# Patient Record
Sex: MALE | Race: WHITE | NOT HISPANIC OR LATINO | URBAN - METROPOLITAN AREA
[De-identification: names, ages, dates, MRNs, and addresses within clinical notes are randomized per-mention and may not be internally consistent; named-entity substitution may affect disease eponyms.]

---

## 2024-03-27 NOTE — PROGRESS NOTES
"NEW PATIENT    SUBJECTIVE:     German Rabago  MRN:  [id number]  38 y.o. male    CHIEF COMPLAINT:    Neck pain    HPI:    German Rabago reports pain to left side of neck for the past 2 to 3 weeks.  Woke up one morning with severe pain, states "I think it may be a pinched nerve".  Denies h/o trauma or injury.  Was seen at [facility] in [place] on 3/25/24, dx with trapezius strain.  Tx with Toradol 60 mg IM, Baclofen 10 mg tid x 7 days and oral prednisone 40 mg daily x 5 days; meds have not helped at all.  Was advised to f/u with his PCP for anything further.  His pain has been located to base of neck shooting down into left shoulder and arm.  NT reported left hand/fingers.  Pain described as stabbing, worse as day progresses.  Current pain 6/10.  Tx with heating pad and stretching exercises.  Sleeping on right side, pain "becomes unbearable" when on his left side.      REVIEW OF SYSTEMS:  Review of Systems      ALLERGIES:  Review of patient's allergies indicates:  No Known Allergies    PROBLEM LIST:  There is no problem list on file for this patient.      CURRENT MEDICATION LIST:   NONE      Past medical, surgical, family and social histories have been reviewed today.      OBJECTIVE:     Vitals:    04/02/24 1330   BP: 120/78   Pulse: 88   Resp: 18   Temp: 97.7 °F (36.5 °C)   SpO2: 98%   Weight: 90.2 kg (198 lb 15.4 oz)   PainSc:   6   PainLoc: Neck       Physical Exam  Vitals reviewed.   Constitutional:       General: He is not in acute distress.  HENT:      Head: Normocephalic and atraumatic.   Musculoskeletal:      Cervical back: Spasms and tenderness present. No swelling, edema, deformity, rigidity, torticollis, bony tenderness or crepitus. Pain with movement present.      Thoracic back: Normal.      Lumbar back: Normal.        Back:    Skin:     Findings: No rash.   Neurological:      Mental Status: He is alert and oriented to person, place, and time.      Sensory: No sensory deficit.      Motor: No weakness.      " Coordination: Coordination normal.      Gait: Gait normal.   Psychiatric:         Mood and Affect: Mood normal.         Behavior: Behavior normal.         Thought Content: Thought content normal.         Judgment: Judgment normal.         ASSESSMENT:     1. Musculoskeletal neck pain  -     methylPREDNISolone acetate injection 80 mg  -     predniSONE (DELTASONE) 20 MG tablet; Take 3 tab by mouth x 5 days, 2 tab PO x 2 days, 1 tab PO x 2 days, then 1/2 tab on last day  Dispense: 22 tablet; Refill: 0  -     metaxalone (SKELAXIN) 800 MG tablet; Take 1 tablet (800 mg total) by mouth 3 (three) times daily as needed for Pain.  Dispense: 30 tablet; Refill: 0    2. Radiculopathy, unspecified spinal region  -     methylPREDNISolone acetate injection 80 mg  -     predniSONE (DELTASONE) 20 MG tablet; Take 3 tab by mouth x 5 days, 2 tab PO x 2 days, 1 tab PO x 2 days, then 1/2 tab on last day  Dispense: 22 tablet; Refill: 0  -     metaxalone (SKELAXIN) 800 MG tablet; Take 1 tablet (800 mg total) by mouth 3 (three) times daily as needed for Pain.  Dispense: 30 tablet; Refill: 0      PLAN:     Steroid IM today.  Medications as ordered, start prednisone in 2 to 3 days if shot wears off.  Supplement with Tylenol, Advil, pain patches or creams prn.  Back care and comfort measures discussed.  Contact office back if pain worsens or lingers.  RTC as directed and/or prn.        LISA Ramos  [facility]       25 minutes of total time spent on the encounter, which includes face to face time and non-face to face time preparing to see the patient.  This includes obtaining and/or reviewing separately obtained history, performing a medically appropriate examination and/or evaluation, and counseling and educating the patient/family/caregiver.  Includes documenting clinical information in the electronic or other health record, independently interpreting results (not separately reported) and communicating  results to the patient/family/caregiver, with care coordination (not separately reported).  Medications, tests and/or procedures ordered as necessary along with referring and communicating with other health professionals (when not separately reported).

## 2024-04-02 ENCOUNTER — OFFICE VISIT (OUTPATIENT)
Dept: FAMILY MEDICINE | Facility: CLINIC | Age: 39
End: 2024-04-02
Payer: COMMERCIAL

## 2024-04-02 VITALS
DIASTOLIC BLOOD PRESSURE: 78 MMHG | RESPIRATION RATE: 18 BRPM | WEIGHT: 198.94 LBS | SYSTOLIC BLOOD PRESSURE: 120 MMHG | OXYGEN SATURATION: 98 % | HEART RATE: 88 BPM | TEMPERATURE: 98 F

## 2024-04-02 DIAGNOSIS — M54.10 RADICULOPATHY, UNSPECIFIED SPINAL REGION: ICD-10-CM

## 2024-04-02 DIAGNOSIS — M54.2 MUSCULOSKELETAL NECK PAIN: Primary | ICD-10-CM

## 2024-04-02 PROCEDURE — 3078F DIAST BP <80 MM HG: CPT | Mod: CPTII,S$GLB,, | Performed by: REGISTERED NURSE

## 2024-04-02 PROCEDURE — 99202 OFFICE O/P NEW SF 15 MIN: CPT | Mod: 25,S$GLB,, | Performed by: REGISTERED NURSE

## 2024-04-02 PROCEDURE — 96372 THER/PROPH/DIAG INJ SC/IM: CPT | Mod: S$GLB,,, | Performed by: REGISTERED NURSE

## 2024-04-02 PROCEDURE — 99999 PR PBB SHADOW E&M-NEW PATIENT-LVL III: CPT | Mod: PBBFAC,,, | Performed by: REGISTERED NURSE

## 2024-04-02 PROCEDURE — 3074F SYST BP LT 130 MM HG: CPT | Mod: CPTII,S$GLB,, | Performed by: REGISTERED NURSE

## 2024-04-02 RX ORDER — METAXALONE 800 MG/1
800 TABLET ORAL 3 TIMES DAILY PRN
Qty: 30 TABLET | Refills: 0 | Status: SHIPPED | OUTPATIENT
Start: 2024-04-02 | End: 2024-04-29 | Stop reason: SDUPTHER

## 2024-04-02 RX ORDER — PREDNISONE 20 MG/1
TABLET ORAL
Qty: 22 TABLET | Refills: 0 | Status: SHIPPED | OUTPATIENT
Start: 2024-04-02 | End: 2024-04-29

## 2024-04-02 RX ORDER — METHYLPREDNISOLONE ACETATE 80 MG/ML
80 INJECTION, SUSPENSION INTRA-ARTICULAR; INTRALESIONAL; INTRAMUSCULAR; SOFT TISSUE ONCE
Status: COMPLETED | OUTPATIENT
Start: 2024-04-02 | End: 2024-04-02

## 2024-04-02 RX ADMIN — METHYLPREDNISOLONE ACETATE 80 MG: 80 INJECTION, SUSPENSION INTRA-ARTICULAR; INTRALESIONAL; INTRAMUSCULAR; SOFT TISSUE at 01:04

## 2024-04-03 ENCOUNTER — TELEPHONE (OUTPATIENT)
Dept: FAMILY MEDICINE | Facility: CLINIC | Age: 39
End: 2024-04-03
Payer: COMMERCIAL

## 2024-04-29 ENCOUNTER — OFFICE VISIT (OUTPATIENT)
Dept: FAMILY MEDICINE | Facility: CLINIC | Age: 39
End: 2024-04-29
Payer: COMMERCIAL

## 2024-04-29 ENCOUNTER — PATIENT MESSAGE (OUTPATIENT)
Dept: FAMILY MEDICINE | Facility: CLINIC | Age: 39
End: 2024-04-29

## 2024-04-29 VITALS
HEART RATE: 81 BPM | DIASTOLIC BLOOD PRESSURE: 74 MMHG | SYSTOLIC BLOOD PRESSURE: 122 MMHG | BODY MASS INDEX: 28.37 KG/M2 | TEMPERATURE: 98 F | WEIGHT: 202.63 LBS | HEIGHT: 71 IN | OXYGEN SATURATION: 98 %

## 2024-04-29 DIAGNOSIS — M54.10 RADICULOPATHY, UNSPECIFIED SPINAL REGION: ICD-10-CM

## 2024-04-29 DIAGNOSIS — M54.2 MUSCULOSKELETAL NECK PAIN: Primary | ICD-10-CM

## 2024-04-29 PROCEDURE — 3078F DIAST BP <80 MM HG: CPT | Mod: CPTII,S$GLB,, | Performed by: REGISTERED NURSE

## 2024-04-29 PROCEDURE — 3074F SYST BP LT 130 MM HG: CPT | Mod: CPTII,S$GLB,, | Performed by: REGISTERED NURSE

## 2024-04-29 PROCEDURE — 3008F BODY MASS INDEX DOCD: CPT | Mod: CPTII,S$GLB,, | Performed by: REGISTERED NURSE

## 2024-04-29 PROCEDURE — 99999 PR PBB SHADOW E&M-EST. PATIENT-LVL III: CPT | Mod: PBBFAC,,, | Performed by: REGISTERED NURSE

## 2024-04-29 PROCEDURE — 99213 OFFICE O/P EST LOW 20 MIN: CPT | Mod: S$GLB,,, | Performed by: REGISTERED NURSE

## 2024-04-29 RX ORDER — METAXALONE 800 MG/1
800 TABLET ORAL 3 TIMES DAILY PRN
Qty: 90 TABLET | Refills: 0 | Status: SHIPPED | OUTPATIENT
Start: 2024-04-29 | End: 2024-05-02 | Stop reason: ALTCHOICE

## 2024-04-29 RX ORDER — IBUPROFEN 800 MG/1
800 TABLET ORAL 3 TIMES DAILY PRN
Qty: 90 TABLET | Refills: 0 | Status: SHIPPED | OUTPATIENT
Start: 2024-04-29

## 2024-04-29 NOTE — PROGRESS NOTES
"SUBJECTIVE:     German Rabago  MRN:  [id number]  38 y.o. male    CHIEF COMPLAINT:     Neck/arm pain follow-up    HPI:    German Rabago returns with recurrent neck/arm issues.  Current pain 5/10.  Last seen in office on 4/2/24, felt better after visit w/ medical txmt as ordered and recommended.  However, issues have slowly/grad returned.  Pain to left side of neck radiating down left arm.  No new/recent trauma or injury.  Did get relief with Skelaxin and steroids as ordered.  Also tx with ice/heat and stretches.      Review of Systems   Constitutional: Negative.    HENT:  Negative for hearing loss.    Eyes: Negative.  Negative for discharge.   Respiratory: Negative.  Negative for wheezing.    Cardiovascular: Negative.  Negative for chest pain and palpitations.   Gastrointestinal:  Negative for blood in stool, constipation, diarrhea and vomiting.   Genitourinary:  Negative for hematuria and urgency.   Musculoskeletal:  Positive for neck pain. Negative for back pain and joint pain.   Neurological:  Positive for sensory change and weakness. Negative for headaches.   Endo/Heme/Allergies:  Negative for polydipsia.         Review of patient's allergies indicates:  No Known Allergies      There is no problem list on file for this patient.      Current Outpatient Medications   Medication Instructions    metaxalone (SKELAXIN) 800 mg, Oral, 3 times daily PRN         Past medical, surgical, family and social histories have been reviewed today.      OBJECTIVE:     Vitals:    04/29/24 1627   BP: 122/74   Pulse: 81   Temp: 98.3 °F (36.8 °C)   TempSrc: Tympanic   SpO2: 98%   Weight: 91.9 kg (202 lb 9.6 oz)   Height: 5' 11" (1.803 m)   PainSc:   5   PainLoc: Neck       Physical Exam  Vitals reviewed.   Constitutional:       General: He is not in acute distress.  HENT:      Head: Normocephalic and atraumatic.   Neck:     Musculoskeletal:         General: No swelling or deformity. Normal range of motion.      Cervical back: No edema, " erythema, rigidity, torticollis or crepitus. Pain with movement and muscular tenderness present. No spinous process tenderness. Normal range of motion.   Skin:     Capillary Refill: Capillary refill takes less than 2 seconds.   Neurological:      Mental Status: He is alert and oriented to person, place, and time.   Psychiatric:         Mood and Affect: Mood normal.         Behavior: Behavior normal.         Thought Content: Thought content normal.         Judgment: Judgment normal.         ASSESSMENT:     1. Musculoskeletal neck pain  -     ibuprofen (ADVIL,MOTRIN) 800 MG tablet; Take 1 tablet (800 mg total) by mouth 3 (three) times daily as needed for Pain.  Dispense: 90 tablet; Refill: 0  -     metaxalone (SKELAXIN) 800 MG tablet; Take 1 tablet (800 mg total) by mouth 3 (three) times daily as needed for Pain.  Dispense: 90 tablet; Refill: 0  -     Ambulatory referral/consult to [facility]; Future; Expected date: 05/06/2024    2. Radiculopathy, unspecified spinal region  -     ibuprofen (ADVIL,MOTRIN) 800 MG tablet; Take 1 tablet (800 mg total) by mouth 3 (three) times daily as needed for Pain.  Dispense: 90 tablet; Refill: 0  -     metaxalone (SKELAXIN) 800 MG tablet; Take 1 tablet (800 mg total) by mouth 3 (three) times daily as needed for Pain.  Dispense: 90 tablet; Refill: 0  -     Ambulatory referral/consult to [facility]; Future; Expected date: 05/06/2024      PLAN:     No further steroids for now.  Refilled Skelaxin, rx for Motrin as directed.  To Back/Spine specialist for further evaluation.  RTC as directed and/or prn.        LISA Ramos  [facility]       20 minutes of total time spent on the encounter, which includes face to face time and non-face to face time preparing to see the patient.  This includes obtaining and/or reviewing separately obtained history, performing a medically appropriate examination and/or evaluation, and counseling and  educating the patient/family/caregiver.  Includes documenting clinical information in the electronic or other health record, independently interpreting results (not separately reported) and communicating results to the patient/family/caregiver, with care coordination (not separately reported).  Medications, tests and/or procedures ordered as necessary along with referring and communicating with other health professionals (when not separately reported).

## 2024-04-30 ENCOUNTER — TELEPHONE (OUTPATIENT)
Dept: PAIN MEDICINE | Facility: CLINIC | Age: 39
End: 2024-04-30
Payer: COMMERCIAL

## 2024-05-02 DIAGNOSIS — M54.2 MUSCULOSKELETAL NECK PAIN: Primary | ICD-10-CM

## 2024-05-02 RX ORDER — ORPHENADRINE CITRATE 100 MG/1
100 TABLET, EXTENDED RELEASE ORAL 2 TIMES DAILY PRN
Qty: 60 TABLET | Refills: 0 | Status: SHIPPED | OUTPATIENT
Start: 2024-05-02

## 2024-05-29 PROBLEM — K57.30 DIVERTICULA OF COLON: Status: ACTIVE | Noted: 2024-05-29

## 2024-09-16 ENCOUNTER — PATIENT OUTREACH (OUTPATIENT)
Dept: ADMINISTRATIVE | Facility: HOSPITAL | Age: 39
End: 2024-09-16
Payer: COMMERCIAL

## 2025-01-13 ENCOUNTER — HOSPITAL ENCOUNTER (INPATIENT)
Facility: HOSPITAL | Age: 40
LOS: 3 days | Discharge: HOME | DRG: 392 | End: 2025-01-16
Attending: EMERGENCY MEDICINE | Admitting: HOSPITALIST
Payer: COMMERCIAL

## 2025-01-13 ENCOUNTER — OFFICE VISIT (OUTPATIENT)
Dept: FAMILY MEDICINE | Facility: CLINIC | Age: 40
End: 2025-01-13
Payer: COMMERCIAL

## 2025-01-13 VITALS
TEMPERATURE: 97 F | BODY MASS INDEX: 24.95 KG/M2 | SYSTOLIC BLOOD PRESSURE: 140 MMHG | HEART RATE: 101 BPM | WEIGHT: 178.25 LBS | DIASTOLIC BLOOD PRESSURE: 58 MMHG | OXYGEN SATURATION: 98 % | RESPIRATION RATE: 18 BRPM | HEIGHT: 71 IN

## 2025-01-13 DIAGNOSIS — R07.9 CHEST PAIN: ICD-10-CM

## 2025-01-13 DIAGNOSIS — K92.0 HEMATEMESIS WITH NAUSEA: ICD-10-CM

## 2025-01-13 DIAGNOSIS — R10.32 LEFT LOWER QUADRANT ABDOMINAL PAIN: ICD-10-CM

## 2025-01-13 DIAGNOSIS — K57.92 DIVERTICULITIS: Primary | ICD-10-CM

## 2025-01-13 DIAGNOSIS — K57.80 PERFORATED DIVERTICULUM: ICD-10-CM

## 2025-01-13 DIAGNOSIS — N17.9 AKI (ACUTE KIDNEY INJURY): ICD-10-CM

## 2025-01-13 DIAGNOSIS — K57.20 DIVERTICULITIS OF COLON WITH PERFORATION: Primary | ICD-10-CM

## 2025-01-13 DIAGNOSIS — R11.2 NAUSEA AND VOMITING, UNSPECIFIED VOMITING TYPE: ICD-10-CM

## 2025-01-13 PROBLEM — F41.1 GENERALIZED ANXIETY DISORDER: Status: ACTIVE | Noted: 2024-05-07

## 2025-01-13 PROBLEM — A41.9 SEPSIS: Status: ACTIVE | Noted: 2025-01-13

## 2025-01-13 LAB
ALBUMIN SERPL BCP-MCNC: 4.2 G/DL (ref 3.5–5.2)
ALP SERPL-CCNC: 84 U/L (ref 40–150)
ALT SERPL W/O P-5'-P-CCNC: 9 U/L (ref 10–44)
AMPHET+METHAMPHET UR QL: NEGATIVE
ANION GAP SERPL CALC-SCNC: 14 MMOL/L (ref 8–16)
AST SERPL-CCNC: 14 U/L (ref 10–40)
BACTERIA #/AREA URNS HPF: ABNORMAL /HPF
BARBITURATES UR QL SCN>200 NG/ML: NEGATIVE
BASOPHILS # BLD AUTO: 0.06 K/UL (ref 0–0.2)
BASOPHILS NFR BLD: 0.4 % (ref 0–1.9)
BENZODIAZ UR QL SCN>200 NG/ML: NEGATIVE
BILIRUB SERPL-MCNC: 0.7 MG/DL (ref 0.1–1)
BILIRUB UR QL STRIP: NEGATIVE
BUN SERPL-MCNC: 27 MG/DL (ref 6–20)
BZE UR QL SCN: NEGATIVE
CALCIUM SERPL-MCNC: 10.2 MG/DL (ref 8.7–10.5)
CANNABINOIDS UR QL SCN: ABNORMAL
CHLORIDE SERPL-SCNC: 101 MMOL/L (ref 95–110)
CLARITY UR: CLEAR
CO2 SERPL-SCNC: 23 MMOL/L (ref 23–29)
COLOR UR: YELLOW
CREAT SERPL-MCNC: 2.3 MG/DL (ref 0.5–1.4)
CREAT UR-MCNC: 192 MG/DL (ref 23–375)
DIFFERENTIAL METHOD BLD: ABNORMAL
EOSINOPHIL # BLD AUTO: 0.1 K/UL (ref 0–0.5)
EOSINOPHIL NFR BLD: 0.3 % (ref 0–8)
ERYTHROCYTE [DISTWIDTH] IN BLOOD BY AUTOMATED COUNT: 13.3 % (ref 11.5–14.5)
EST. GFR  (NO RACE VARIABLE): 36 ML/MIN/1.73 M^2
GLUCOSE SERPL-MCNC: 96 MG/DL (ref 70–110)
GLUCOSE UR QL STRIP: NEGATIVE
HCT VFR BLD AUTO: 48.5 % (ref 40–54)
HGB BLD-MCNC: 16.3 G/DL (ref 14–18)
HGB UR QL STRIP: ABNORMAL
HYALINE CASTS #/AREA URNS LPF: 3 /LPF
IMM GRANULOCYTES # BLD AUTO: 0.09 K/UL (ref 0–0.04)
IMM GRANULOCYTES NFR BLD AUTO: 0.6 % (ref 0–0.5)
KETONES UR QL STRIP: ABNORMAL
LACTATE SERPL-SCNC: 2.2 MMOL/L (ref 0.5–2.2)
LEUKOCYTE ESTERASE UR QL STRIP: ABNORMAL
LIPASE SERPL-CCNC: 67 U/L (ref 4–60)
LYMPHOCYTES # BLD AUTO: 2.8 K/UL (ref 1–4.8)
LYMPHOCYTES NFR BLD: 18.1 % (ref 18–48)
MCH RBC QN AUTO: 29.8 PG (ref 27–31)
MCHC RBC AUTO-ENTMCNC: 33.6 G/DL (ref 32–36)
MCV RBC AUTO: 89 FL (ref 82–98)
METHADONE UR QL SCN>300 NG/ML: NEGATIVE
MICROSCOPIC COMMENT: ABNORMAL
MONOCYTES # BLD AUTO: 1.5 K/UL (ref 0.3–1)
MONOCYTES NFR BLD: 9.4 % (ref 4–15)
NEUTROPHILS # BLD AUTO: 11.2 K/UL (ref 1.8–7.7)
NEUTROPHILS NFR BLD: 71.2 % (ref 38–73)
NITRITE UR QL STRIP: NEGATIVE
NRBC BLD-RTO: 0 /100 WBC
OPIATES UR QL SCN: ABNORMAL
PCP UR QL SCN>25 NG/ML: NEGATIVE
PH UR STRIP: 6 [PH] (ref 5–8)
PLATELET # BLD AUTO: 452 K/UL (ref 150–450)
PMV BLD AUTO: 10 FL (ref 9.2–12.9)
POCT GLUCOSE: 100 MG/DL (ref 70–110)
POTASSIUM SERPL-SCNC: 3.8 MMOL/L (ref 3.5–5.1)
PROT SERPL-MCNC: 8.6 G/DL (ref 6–8.4)
PROT UR QL STRIP: ABNORMAL
RBC # BLD AUTO: 5.47 M/UL (ref 4.6–6.2)
RBC #/AREA URNS HPF: 4 /HPF (ref 0–4)
SODIUM SERPL-SCNC: 138 MMOL/L (ref 136–145)
SP GR UR STRIP: 1.01 (ref 1–1.03)
TOXICOLOGY INFORMATION: ABNORMAL
URN SPEC COLLECT METH UR: ABNORMAL
UROBILINOGEN UR STRIP-ACNC: NEGATIVE EU/DL
WBC # BLD AUTO: 15.67 K/UL (ref 3.9–12.7)
WBC #/AREA URNS HPF: 9 /HPF (ref 0–5)

## 2025-01-13 PROCEDURE — 83605 ASSAY OF LACTIC ACID: CPT

## 2025-01-13 PROCEDURE — 99999 PR PBB SHADOW E&M-EST. PATIENT-LVL III: CPT | Mod: PBBFAC,,, | Performed by: REGISTERED NURSE

## 2025-01-13 PROCEDURE — 96365 THER/PROPH/DIAG IV INF INIT: CPT

## 2025-01-13 PROCEDURE — 81000 URINALYSIS NONAUTO W/SCOPE: CPT

## 2025-01-13 PROCEDURE — 99285 EMERGENCY DEPT VISIT HI MDM: CPT | Mod: 25

## 2025-01-13 PROCEDURE — 3077F SYST BP >= 140 MM HG: CPT | Mod: CPTII,S$GLB,, | Performed by: REGISTERED NURSE

## 2025-01-13 PROCEDURE — 63600175 PHARM REV CODE 636 W HCPCS

## 2025-01-13 PROCEDURE — 25000003 PHARM REV CODE 250

## 2025-01-13 PROCEDURE — 99222 1ST HOSP IP/OBS MODERATE 55: CPT | Mod: ,,, | Performed by: SURGERY

## 2025-01-13 PROCEDURE — 25000003 PHARM REV CODE 250: Performed by: EMERGENCY MEDICINE

## 2025-01-13 PROCEDURE — 63600175 PHARM REV CODE 636 W HCPCS: Performed by: EMERGENCY MEDICINE

## 2025-01-13 PROCEDURE — 11000001 HC ACUTE MED/SURG PRIVATE ROOM

## 2025-01-13 PROCEDURE — 25000003 PHARM REV CODE 250: Performed by: HOSPITALIST

## 2025-01-13 PROCEDURE — 87040 BLOOD CULTURE FOR BACTERIA: CPT | Mod: 59 | Performed by: HOSPITALIST

## 2025-01-13 PROCEDURE — 80053 COMPREHEN METABOLIC PANEL: CPT

## 2025-01-13 PROCEDURE — G2211 COMPLEX E/M VISIT ADD ON: HCPCS | Mod: S$GLB,,, | Performed by: REGISTERED NURSE

## 2025-01-13 PROCEDURE — 3008F BODY MASS INDEX DOCD: CPT | Mod: CPTII,S$GLB,, | Performed by: REGISTERED NURSE

## 2025-01-13 PROCEDURE — 63600175 PHARM REV CODE 636 W HCPCS: Performed by: HOSPITALIST

## 2025-01-13 PROCEDURE — 83690 ASSAY OF LIPASE: CPT

## 2025-01-13 PROCEDURE — 96361 HYDRATE IV INFUSION ADD-ON: CPT

## 2025-01-13 PROCEDURE — 3078F DIAST BP <80 MM HG: CPT | Mod: CPTII,S$GLB,, | Performed by: REGISTERED NURSE

## 2025-01-13 PROCEDURE — 80307 DRUG TEST PRSMV CHEM ANLYZR: CPT

## 2025-01-13 PROCEDURE — 99214 OFFICE O/P EST MOD 30 MIN: CPT | Mod: S$GLB,,, | Performed by: REGISTERED NURSE

## 2025-01-13 PROCEDURE — 96375 TX/PRO/DX INJ NEW DRUG ADDON: CPT

## 2025-01-13 PROCEDURE — 87040 BLOOD CULTURE FOR BACTERIA: CPT

## 2025-01-13 PROCEDURE — 85025 COMPLETE CBC W/AUTO DIFF WBC: CPT

## 2025-01-13 RX ORDER — NALOXONE HCL 0.4 MG/ML
0.02 VIAL (ML) INJECTION
Status: DISCONTINUED | OUTPATIENT
Start: 2025-01-13 | End: 2025-01-16 | Stop reason: HOSPADM

## 2025-01-13 RX ORDER — METRONIDAZOLE 500 MG/100ML
500 INJECTION, SOLUTION INTRAVENOUS EVERY 8 HOURS
Status: DISCONTINUED | OUTPATIENT
Start: 2025-01-13 | End: 2025-01-14

## 2025-01-13 RX ORDER — IBUPROFEN 200 MG
24 TABLET ORAL
Status: DISCONTINUED | OUTPATIENT
Start: 2025-01-13 | End: 2025-01-16 | Stop reason: HOSPADM

## 2025-01-13 RX ORDER — SODIUM CHLORIDE 9 MG/ML
1000 INJECTION, SOLUTION INTRAVENOUS
Status: COMPLETED | OUTPATIENT
Start: 2025-01-13 | End: 2025-01-13

## 2025-01-13 RX ORDER — MORPHINE SULFATE 4 MG/ML
2 INJECTION, SOLUTION INTRAMUSCULAR; INTRAVENOUS EVERY 6 HOURS PRN
Status: DISCONTINUED | OUTPATIENT
Start: 2025-01-13 | End: 2025-01-14

## 2025-01-13 RX ORDER — IBUPROFEN 200 MG
16 TABLET ORAL
Status: DISCONTINUED | OUTPATIENT
Start: 2025-01-13 | End: 2025-01-16 | Stop reason: HOSPADM

## 2025-01-13 RX ORDER — CIPROFLOXACIN 500 MG/1
500 TABLET ORAL
Status: ON HOLD | COMMUNITY
End: 2025-01-16 | Stop reason: HOSPADM

## 2025-01-13 RX ORDER — MORPHINE SULFATE 4 MG/ML
4 INJECTION, SOLUTION INTRAMUSCULAR; INTRAVENOUS
Status: COMPLETED | OUTPATIENT
Start: 2025-01-13 | End: 2025-01-13

## 2025-01-13 RX ORDER — SODIUM CHLORIDE 9 MG/ML
INJECTION, SOLUTION INTRAVENOUS CONTINUOUS
Status: DISCONTINUED | OUTPATIENT
Start: 2025-01-13 | End: 2025-01-16 | Stop reason: HOSPADM

## 2025-01-13 RX ORDER — MORPHINE SULFATE 4 MG/ML
4 INJECTION, SOLUTION INTRAMUSCULAR; INTRAVENOUS EVERY 6 HOURS PRN
Status: DISCONTINUED | OUTPATIENT
Start: 2025-01-13 | End: 2025-01-14

## 2025-01-13 RX ORDER — ACETAMINOPHEN 325 MG/1
650 TABLET ORAL EVERY 8 HOURS PRN
Status: DISCONTINUED | OUTPATIENT
Start: 2025-01-13 | End: 2025-01-16 | Stop reason: HOSPADM

## 2025-01-13 RX ORDER — TALC
6 POWDER (GRAM) TOPICAL NIGHTLY PRN
Status: DISCONTINUED | OUTPATIENT
Start: 2025-01-13 | End: 2025-01-16 | Stop reason: HOSPADM

## 2025-01-13 RX ORDER — GLUCAGON 1 MG
1 KIT INJECTION
Status: DISCONTINUED | OUTPATIENT
Start: 2025-01-13 | End: 2025-01-16 | Stop reason: HOSPADM

## 2025-01-13 RX ORDER — METRONIDAZOLE 500 MG/1
500 TABLET ORAL EVERY 12 HOURS
Status: ON HOLD | COMMUNITY
End: 2025-01-16 | Stop reason: HOSPADM

## 2025-01-13 RX ORDER — CEFTRIAXONE 2 G/1
2 INJECTION, POWDER, FOR SOLUTION INTRAMUSCULAR; INTRAVENOUS
Status: DISCONTINUED | OUTPATIENT
Start: 2025-01-13 | End: 2025-01-14

## 2025-01-13 RX ORDER — SODIUM CHLORIDE 0.9 % (FLUSH) 0.9 %
10 SYRINGE (ML) INJECTION EVERY 12 HOURS PRN
Status: DISCONTINUED | OUTPATIENT
Start: 2025-01-13 | End: 2025-01-16 | Stop reason: HOSPADM

## 2025-01-13 RX ORDER — ONDANSETRON HYDROCHLORIDE 2 MG/ML
4 INJECTION, SOLUTION INTRAVENOUS EVERY 6 HOURS PRN
Status: DISCONTINUED | OUTPATIENT
Start: 2025-01-13 | End: 2025-01-16 | Stop reason: HOSPADM

## 2025-01-13 RX ORDER — ONDANSETRON HYDROCHLORIDE 2 MG/ML
4 INJECTION, SOLUTION INTRAVENOUS
Status: COMPLETED | OUTPATIENT
Start: 2025-01-13 | End: 2025-01-13

## 2025-01-13 RX ADMIN — MORPHINE SULFATE 4 MG: 4 INJECTION INTRAVENOUS at 01:01

## 2025-01-13 RX ADMIN — MORPHINE SULFATE 4 MG: 4 INJECTION INTRAVENOUS at 08:01

## 2025-01-13 RX ADMIN — SODIUM CHLORIDE: 9 INJECTION, SOLUTION INTRAVENOUS at 08:01

## 2025-01-13 RX ADMIN — ONDANSETRON 4 MG: 2 INJECTION INTRAMUSCULAR; INTRAVENOUS at 09:01

## 2025-01-13 RX ADMIN — METRONIDAZOLE 500 MG: 500 INJECTION, SOLUTION INTRAVENOUS at 08:01

## 2025-01-13 RX ADMIN — SODIUM CHLORIDE 1000 ML: 9 INJECTION, SOLUTION INTRAVENOUS at 03:01

## 2025-01-13 RX ADMIN — PIPERACILLIN SODIUM AND TAZOBACTAM SODIUM 4.5 G: 4; .5 INJECTION, POWDER, FOR SOLUTION INTRAVENOUS at 04:01

## 2025-01-13 RX ADMIN — CEFTRIAXONE 2 G: 2 INJECTION, POWDER, FOR SOLUTION INTRAMUSCULAR; INTRAVENOUS at 08:01

## 2025-01-13 RX ADMIN — ONDANSETRON 4 MG: 2 INJECTION INTRAMUSCULAR; INTRAVENOUS at 01:01

## 2025-01-13 NOTE — PROGRESS NOTES
"German Rabago  MRN:  [id number]  39 y.o. male      SUBJECTIVE:     CHIEF COMPLAINT:      - Diverticulitis    HPI:  Max was seen last week at Urgent Care for abdominal pain. He was diagnosed with acute exacerbation of diverticular disease and constipation. He was treated with Cipro, Flagyl and Lactulose. He reports feeling worse, no BM since last week. Reports episode of vomiting up brown fluid with increased abdominal pain and nausea. Pain located to the right side of his abdomen, which is his usual area of pain when diverticulitis flares up.    He has been previously seen by GI (Dr. Sanchez).    History of perforated diverticulum.      Review of Systems   Constitutional:  Positive for chills, diaphoresis, fever and malaise/fatigue.        With temp of 99 last week   HENT: Negative.     Respiratory: Negative.     Cardiovascular: Negative.  Negative for chest pain and palpitations.   Gastrointestinal:  Positive for abdominal pain, constipation, nausea and vomiting. Negative for blood in stool, diarrhea, heartburn and melena.        + hematemesis   Genitourinary: Negative.  Negative for hematuria and urgency.   Musculoskeletal: Negative.    Neurological:  Positive for weakness. Negative for dizziness, loss of consciousness and headaches.       Review of patient's allergies indicates:  No Known Allergies      Patient Active Problem List   Diagnosis    Diverticula of colon    Generalized anxiety disorder       Current Outpatient Medications   Medication Instructions    ibuprofen (ADVIL,MOTRIN) 800 mg, Oral, 3 times daily PRN    orphenadrine (NORFLEX) 100 mg, Oral, 2 times daily PRN         Past medical, surgical, family and social histories have been reviewed today.      OBJECTIVE:     Vitals:    01/13/25 1105   BP: (!) 140/58   Pulse: 101   Resp: 18   Temp: 97.1 °F (36.2 °C)   TempSrc: Tympanic   SpO2: 98%   Weight: 80.8 kg (178 lb 3.9 oz)   Height: 5' 11" (1.803 m)     Vitals:    01/13/25 1105   PainSc: 10-Worst pain " "ever   PainLoc: Abdomen       Physical Exam  Vitals reviewed.   Constitutional:       Appearance: He is ill-appearing. He is not diaphoretic.   Abdominal:      General: Bowel sounds are increased. There is distension (mild).      Palpations: Abdomen is soft.      Tenderness: There is abdominal tenderness in the right upper quadrant and right lower quadrant. There is guarding. There is no right CVA tenderness, left CVA tenderness or rebound.      Comments: BS increased to RT side of abdomen, normal LT side. He localizes pain to "my usual area of diverticulitis".   Neurological:      Mental Status: He is alert and oriented to person, place, and time.   Psychiatric:         Mood and Affect: Mood normal.         Behavior: Behavior normal.         Thought Content: Thought content normal.         Judgment: Judgment normal.         ASSESSMENT:     1. Diverticulitis ---- treated by  recently with Cipro and Flagyl, worsening since that visit    2. Hematemesis with nausea    3. Perforated diverticulum ----- h/o      PLAN:     To ER now.  RTC as directed and/or prn.        LISA Ramos  [facility]       30 minutes of total time spent on the encounter, which includes face to face time and non-face to face time preparing to see the patient.  This includes obtaining and/or reviewing separately obtained history, performing a medically appropriate examination and/or evaluation, and counseling and educating the patient/family/caregiver.  Includes documenting clinical information in the electronic or other health record, independently interpreting results (not separately reported) and communicating results to the patient/family/caregiver, with care coordination (not separately reported).  Medications, tests and/or procedures ordered as necessary along with referring and communicating with other health professionals (when not separately reported).    "

## 2025-01-13 NOTE — Clinical Note
Diagnosis: Diverticulitis of colon with perforation [391164]   Future Attending Provider: STEFAN GANDARA [[id number]]   Requested Bed Type: Standard [1]   Reason for IP Medical Treatment  (Clinical interventions that can only be accomplished in the IP setting? ) :: IV Antibioitcs   Reason for IP Medical Treatment  (Clinical interventions that can only be accomplished in the IP setting? ) :: Surgery evaluation   Plans for Post-Acute care--if anticipated (pick the single best option):: A. No post acute care anticipated at this time   Special Needs:: No Special Needs [1]

## 2025-01-13 NOTE — ED PROVIDER NOTES
Encounter Date: 2025       History     Chief Complaint   Patient presents with    Abdominal Pain     Pt states he is having problems with his diverticulitis for the past 3 days. Pt states he has been having nausea, vomiting, diarrhea and abd pain.       39-year-old male with a previous medical history of diverticulitis presents to the emergency department with left lower quadrant abdominal pain, nausea, vomiting that began on Thursday.  Reports he was seen at urgent care and prescribed Cipro and Flagyl.  Reports he is taking the medication as prescribed, reports the abdominal pain worsened over the course of the past 2 days.  Was seen by his PCP and sent here for rule out of perforation.  He denies any chest pain, shortness for breath, dizziness, lightheadedness, fever, chills, or any other symptoms.        Review of patient's allergies indicates:  No Known Allergies  Past Medical History:   Diagnosis Date    Diverticular disease of large intestine without perforation or abscess      Past Surgical History:   Procedure Laterality Date    SMALL INTESTINE SURGERY  Can't remember    Long time ago at [facility] Long thought I had a hernia but it was a fatty deposit on small intestine     Family History   Problem Relation Name Age of Onset    Depression Mother Maryam Rabago      Social History     Tobacco Use    Smoking status: Some Days     Current packs/day: 0.00     Average packs/day: 0.5 packs/day for 15.0 years (7.5 ttl pk-yrs)     Types: Cigarettes     Last attempt to quit: 1/15/2019     Years since quittin.0    Smokeless tobacco: Never    Tobacco comments:     Since 17   Substance Use Topics    Alcohol use: Never    Drug use: Not Currently     Frequency: 7.0 times per week     Types: Marijuana     Comment: For mental health use     Review of Systems   Constitutional:  Negative for fever.   HENT:  Negative for sore throat.    Respiratory:  Negative for shortness of breath.    Cardiovascular:  Negative for chest  pain.   Gastrointestinal:  Positive for abdominal pain, nausea and vomiting.   Genitourinary:  Negative for dysuria.   Musculoskeletal:  Negative for back pain.   Skin:  Negative for rash.   Neurological:  Negative for dizziness, weakness and light-headedness.   Hematological:  Does not bruise/bleed easily.       Physical Exam     Initial Vitals [01/13/25 1220]   BP Pulse Resp Temp SpO2   (!) 136/94 77 16 98.6 °F (37 °C) 99 %      MAP       --         Physical Exam    Nursing note and vitals reviewed.  Constitutional: He appears well-developed and well-nourished.   HENT:   Head: Normocephalic and atraumatic.   Right Ear: Hearing normal.   Left Ear: Hearing normal.   Nose: Nose normal. Mouth/Throat: Uvula is midline, oropharynx is clear and moist and mucous membranes are normal.   Eyes: Conjunctivae, EOM and lids are normal. Pupils are equal, round, and reactive to light.   Neck: Trachea normal. Neck supple.   Normal range of motion.  Cardiovascular:  Normal rate, regular rhythm, normal heart sounds, intact distal pulses and normal pulses.           Pulmonary/Chest: Effort normal and breath sounds normal.   Abdominal: Bowel sounds are normal. He exhibits distension. There is abdominal tenderness in the right lower quadrant and left lower quadrant. There is rigidity, rebound and guarding.   Musculoskeletal:         General: Normal range of motion.      Cervical back: Normal, normal range of motion and neck supple.     Neurological: He is alert and oriented to person, place, and time. He has normal strength and normal reflexes. No cranial nerve deficit or sensory deficit. GCS eye subscore is 4. GCS verbal subscore is 5. GCS motor subscore is 6.   Skin: Skin is warm and dry.   Psychiatric: He has a normal mood and affect. His behavior is normal. Thought content normal.         ED Course   Procedures  Labs Reviewed   CBC W/ AUTO DIFFERENTIAL - Abnormal       Result Value    WBC 15.67 (*)     RBC 5.47      Hemoglobin 16.3       Hematocrit 48.5      MCV 89      MCH 29.8      MCHC 33.6      RDW 13.3      Platelets 452 (*)     MPV 10.0      Immature Granulocytes 0.6 (*)     Gran # (ANC) 11.2 (*)     Immature Grans (Abs) 0.09 (*)     Lymph # 2.8      Mono # 1.5 (*)     Eos # 0.1      Baso # 0.06      nRBC 0      Gran % 71.2      Lymph % 18.1      Mono % 9.4      Eosinophil % 0.3      Basophil % 0.4      Differential Method Automated     COMPREHENSIVE METABOLIC PANEL - Abnormal    Sodium 138      Potassium 3.8      Chloride 101      CO2 23      Glucose 96      BUN 27 (*)     Creatinine 2.3 (*)     Calcium 10.2      Total Protein 8.6 (*)     Albumin 4.2      Total Bilirubin 0.7      Alkaline Phosphatase 84      AST 14      ALT 9 (*)     eGFR 36 (*)     Anion Gap 14     LIPASE - Abnormal    Lipase 67 (*)    URINALYSIS, REFLEX TO URINE CULTURE - Abnormal    Specimen UA Urine, Clean Catch      Color, UA Yellow      Appearance, UA Clear      pH, UA 6.0      Specific Gravity, UA 1.015      Protein, UA 1+ (*)     Glucose, UA Negative      Ketones, UA 2+ (*)     Bilirubin (UA) Negative      Occult Blood UA 1+ (*)     Nitrite, UA Negative      Urobilinogen, UA Negative      Leukocytes, UA Trace (*)     Narrative:     Specimen Source->Urine   URINALYSIS MICROSCOPIC - Abnormal    RBC, UA 4      WBC, UA 9 (*)     Bacteria Rare      Hyaline Casts, UA 3 (*)     Microscopic Comment SEE COMMENT      Narrative:     Specimen Source->Urine   DRUG SCREEN PANEL, URINE EMERGENCY - Abnormal    Benzodiazepines Negative      Methadone metabolites Negative      Cocaine (Metab.) Negative      Opiate Scrn, Ur Presumptive Positive (*)     Barbiturate Screen, Ur Negative      Amphetamine Screen, Ur Negative      THC Presumptive Positive (*)     Phencyclidine Negative      Creatinine, Urine 192.0      Toxicology Information SEE COMMENT      Narrative:     Specimen Source->Urine   COMPREHENSIVE METABOLIC PANEL - Abnormal    Sodium 139      Potassium 3.7      Chloride 108       CO2 18 (*)     Glucose 87      BUN 25 (*)     Creatinine 1.6 (*)     Calcium 8.8      Total Protein 6.7      Albumin 3.3 (*)     Total Bilirubin 0.5      Alkaline Phosphatase 66      AST 9 (*)     ALT 8 (*)     eGFR 56 (*)     Anion Gap 13     CBC W/ AUTO DIFFERENTIAL - Abnormal    WBC 10.84      RBC 4.56 (*)     Hemoglobin 13.9 (*)     Hematocrit 41.3      MCV 91      MCH 30.5      MCHC 33.7      RDW 13.5      Platelets 382      MPV 9.7      Immature Granulocytes 0.6 (*)     Gran # (ANC) 7.0      Immature Grans (Abs) 0.06 (*)     Lymph # 2.7      Mono # 1.0      Eos # 0.1      Baso # 0.06      nRBC 0      Gran % 64.1      Lymph % 24.8      Mono % 9.1      Eosinophil % 0.8      Basophil % 0.6      Differential Method Automated     CULTURE, BLOOD    Blood Culture, Routine No Growth to date     CULTURE, BLOOD    Blood Culture, Routine No Growth to date     CULTURE, BLOOD    Blood Culture, Routine No Growth to date     CULTURE, BLOOD    Blood Culture, Routine No Growth to date     LACTIC ACID, PLASMA    Lactate (Lactic Acid) 2.2     DRUG SCREEN PANEL, URINE EMERGENCY   LACTIC ACID, PLASMA    Lactate (Lactic Acid) 0.6     MAGNESIUM    Magnesium 2.2     PHOSPHORUS    Phosphorus 3.4     POCT GLUCOSE    POCT Glucose 100     POCT GLUCOSE MONITORING CONTINUOUS          Imaging Results              CT Abdomen Pelvis  Without Contrast (Final result)  Result time 01/13/25 15:44:40      Final result by Rayomn Law MD (01/13/25 15:44:40)                   Impression:      Findings probably representing focally perforated diverticulitis on a background of acute on chronic inflammatory small bowel disease.  Alternatively the inflamed small bowel may be totally secondary to the perforated colonic diverticulum.  Fat within the wall of the distal small bowel suggests a history of infectious and/or inflammatory distal ileitis.  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  No drainable fluid collection.  Colonoscopy  is recommended when clinically appropriate to rule out underlying colonic mass.  Consider surgical consultation to aid in management.    Findings communicated to MARSHALL Rivero by epic chat on January 13, 2025 at 15:43.      Electronically signed by: Raymon Mauricio  Date:    01/13/2025  Time:    15:44               Narrative:    EXAMINATION:  CT ABDOMEN PELVIS WITHOUT CONTRAST    CLINICAL HISTORY:  LLQ abdominal pain.  History diverticulitis, BRENDON, unable to use IV contrast;    COMPARISON:  None available.    TECHNIQUE:  Axial CT images were obtained of the abdomen and pelvis.  Iterative reconstruction technique was used. The CT exam was performed using one or more of the following dose reduction techniques- Automated exposure control, adjustment of the mA and/or kV according to patient size, and/or use of iterative reconstructed technique.    FINDINGS:  Heart: Normal in size. No pericardial effusion.    Lung Bases: Mild bibasilar atelectasis/scarring.    Liver: Unremarkable.    Gallbladder: No calcified gallstones.    Bile Ducts: No evidence of dilated ducts.    Pancreas: No mass or peripancreatic fat stranding.    Spleen: Unremarkable.    Adrenals: Unremarkable.    Kidneys/ Ureters: Unremarkable.    Bladder: No evidence of wall thickening.    Reproductive organs: Unremarkable.    GI Tract/Mesentery: Multiple colonic diverticula.  In the pelvis between the sigmoid colon and multiple inflamed loops of small bowel there are numerous locules of extraluminal gas.  Mild associated free fluid.  No drainable fluid collection.  No evidence of bowel obstruction.  There is fatty infiltration throughout the wall of the distal ileum.  The appendix has a normal appearance.    Peritoneal Space: As above.    Retroperitoneum: No adenopathy.    Abdominal wall: Unremarkable.    Vasculature: Mild atherosclerotic disease. No aortic aneurysm.    Bones: No acute fracture.                                       Medications   sodium chloride  0.9% flush 10 mL (has no administration in time range)   naloxone 0.4 mg/mL injection 0.02 mg (has no administration in time range)   glucose chewable tablet 16 g (has no administration in time range)   glucose chewable tablet 24 g (has no administration in time range)   dextrose 50% injection 12.5 g (has no administration in time range)   dextrose 50% injection 25 g (has no administration in time range)   glucagon (human recombinant) injection 1 mg (has no administration in time range)   ondansetron injection 4 mg (4 mg Intravenous Given 1/13/25 2100)   melatonin tablet 6 mg (has no administration in time range)   acetaminophen tablet 650 mg (has no administration in time range)   morphine injection 4 mg (4 mg Intravenous Given 1/13/25 2045)   morphine injection 2 mg (has no administration in time range)   0.9% NaCl infusion ( Intravenous New Bag 1/13/25 2001)   cefTRIAXone injection 2 g (2 g Intravenous Given 1/13/25 2055)   metronidazole IVPB 500 mg (0 mg Intravenous Stopped 1/14/25 0745)   traZODone tablet 50 mg (has no administration in time range)   potassium chloride SA CR tablet 30 mEq (has no administration in time range)   morphine injection 4 mg (4 mg Intravenous Given 1/13/25 1350)   ondansetron injection 4 mg (4 mg Intravenous Given 1/13/25 1349)   0.9% NaCl infusion (0 mLs Intravenous Stopped 1/13/25 1644)   piperacillin-tazobactam (ZOSYN) 4.5 g in D5W 100 mL IVPB (MB+) (0 g Intravenous Stopped 1/13/25 1716)     Medical Decision Making  4:16 PM  Discussed patient case with Dr. Hampton, ED attending, agrees with treatment plan and current management of the patient.  Agrees with general surgery consult.    4:31 PM  Dr. Rivas, on-call general surgery, we will come see the patient in the emergency department.    6:49 PM  Dr. Rivas, on-call general surgery, evaluated the patient at bedside, states to admit to hospital medicine. Discussed patient's case with Yola De La Garza NP, Hospital Medicine, agrees with  treatment plan and management of the patient.  Agrees to accept patient under the care of Dr. Edgar at this time.  Admitting Service: Hospital Medicine  Admitting Physician: Dr. Edgar  Admit to: Inpatient     Amount and/or Complexity of Data Reviewed  Labs: ordered.  Radiology: ordered.    Risk  Prescription drug management.  Decision regarding hospitalization.                                      Clinical Impression:  Final diagnoses:  [K57.20] Diverticulitis of colon with perforation (Primary)  [R10.32] Left lower quadrant abdominal pain  [R11.2] Nausea and vomiting, unspecified vomiting type          ED Disposition Condition    Admit Stable                Robbin Rivero, MARSHALL  01/14/25 1114

## 2025-01-13 NOTE — FIRST PROVIDER EVALUATION
"Medical screening examination initiated.  I have conducted a focused provider triage encounter, findings are as follows:    Brief history of present illness:  39-year-old male with a history of diverticulitis presents to the emergency department chief complaint of abdominal pain.  Reports he was seen at urgent care on Thursday and prescribed Cipro and Flagyl for diverticulitis flare-up.  Reports the pain has been worsening.  Reports associated nausea and vomiting.  Denies any fever, chills or any other symptoms.    Vitals:    01/13/25 1220   BP: (!) 136/94   BP Location: Right arm   Pulse: 77   Resp: 16   Temp: 98.6 °F (37 °C)   TempSrc: Oral   SpO2: 99%   Weight: 80 kg (176 lb 5.9 oz)   Height: 5' 11" (1.803 m)       Pertinent physical exam:  Abdominal pain, history of diverticulitis    Brief workup plan:  Workup, CT    Preliminary workup initiated; this workup will be continued and followed by the physician or advanced practice provider that is assigned to the patient when roomed.  "

## 2025-01-14 LAB
ALBUMIN SERPL BCP-MCNC: 3.3 G/DL (ref 3.5–5.2)
ALP SERPL-CCNC: 66 U/L (ref 40–150)
ALT SERPL W/O P-5'-P-CCNC: 8 U/L (ref 10–44)
ANION GAP SERPL CALC-SCNC: 13 MMOL/L (ref 8–16)
AST SERPL-CCNC: 9 U/L (ref 10–40)
BASOPHILS # BLD AUTO: 0.06 K/UL (ref 0–0.2)
BASOPHILS NFR BLD: 0.6 % (ref 0–1.9)
BILIRUB SERPL-MCNC: 0.5 MG/DL (ref 0.1–1)
BUN SERPL-MCNC: 25 MG/DL (ref 6–20)
CALCIUM SERPL-MCNC: 8.8 MG/DL (ref 8.7–10.5)
CHLORIDE SERPL-SCNC: 108 MMOL/L (ref 95–110)
CO2 SERPL-SCNC: 18 MMOL/L (ref 23–29)
CREAT SERPL-MCNC: 1.6 MG/DL (ref 0.5–1.4)
DIFFERENTIAL METHOD BLD: ABNORMAL
EOSINOPHIL # BLD AUTO: 0.1 K/UL (ref 0–0.5)
EOSINOPHIL NFR BLD: 0.8 % (ref 0–8)
ERYTHROCYTE [DISTWIDTH] IN BLOOD BY AUTOMATED COUNT: 13.5 % (ref 11.5–14.5)
EST. GFR  (NO RACE VARIABLE): 56 ML/MIN/1.73 M^2
GLUCOSE SERPL-MCNC: 87 MG/DL (ref 70–110)
HCT VFR BLD AUTO: 41.3 % (ref 40–54)
HGB BLD-MCNC: 13.9 G/DL (ref 14–18)
IMM GRANULOCYTES # BLD AUTO: 0.06 K/UL (ref 0–0.04)
IMM GRANULOCYTES NFR BLD AUTO: 0.6 % (ref 0–0.5)
LACTATE SERPL-SCNC: 0.6 MMOL/L (ref 0.5–2.2)
LYMPHOCYTES # BLD AUTO: 2.7 K/UL (ref 1–4.8)
LYMPHOCYTES NFR BLD: 24.8 % (ref 18–48)
MAGNESIUM SERPL-MCNC: 2.2 MG/DL (ref 1.6–2.6)
MCH RBC QN AUTO: 30.5 PG (ref 27–31)
MCHC RBC AUTO-ENTMCNC: 33.7 G/DL (ref 32–36)
MCV RBC AUTO: 91 FL (ref 82–98)
MONOCYTES # BLD AUTO: 1 K/UL (ref 0.3–1)
MONOCYTES NFR BLD: 9.1 % (ref 4–15)
NEUTROPHILS # BLD AUTO: 7 K/UL (ref 1.8–7.7)
NEUTROPHILS NFR BLD: 64.1 % (ref 38–73)
NRBC BLD-RTO: 0 /100 WBC
PHOSPHATE SERPL-MCNC: 3.4 MG/DL (ref 2.7–4.5)
PLATELET # BLD AUTO: 382 K/UL (ref 150–450)
PMV BLD AUTO: 9.7 FL (ref 9.2–12.9)
POTASSIUM SERPL-SCNC: 3.7 MMOL/L (ref 3.5–5.1)
PROT SERPL-MCNC: 6.7 G/DL (ref 6–8.4)
RBC # BLD AUTO: 4.56 M/UL (ref 4.6–6.2)
SODIUM SERPL-SCNC: 139 MMOL/L (ref 136–145)
WBC # BLD AUTO: 10.84 K/UL (ref 3.9–12.7)

## 2025-01-14 PROCEDURE — 63600175 PHARM REV CODE 636 W HCPCS: Performed by: HOSPITALIST

## 2025-01-14 PROCEDURE — 63600175 PHARM REV CODE 636 W HCPCS: Performed by: STUDENT IN AN ORGANIZED HEALTH CARE EDUCATION/TRAINING PROGRAM

## 2025-01-14 PROCEDURE — 83735 ASSAY OF MAGNESIUM: CPT | Performed by: HOSPITALIST

## 2025-01-14 PROCEDURE — 63600175 PHARM REV CODE 636 W HCPCS

## 2025-01-14 PROCEDURE — 21400001 HC TELEMETRY ROOM

## 2025-01-14 PROCEDURE — 80053 COMPREHEN METABOLIC PANEL: CPT | Performed by: HOSPITALIST

## 2025-01-14 PROCEDURE — 84100 ASSAY OF PHOSPHORUS: CPT | Performed by: HOSPITALIST

## 2025-01-14 PROCEDURE — 83605 ASSAY OF LACTIC ACID: CPT | Performed by: HOSPITALIST

## 2025-01-14 PROCEDURE — 25000003 PHARM REV CODE 250

## 2025-01-14 PROCEDURE — 99232 SBSQ HOSP IP/OBS MODERATE 35: CPT | Mod: ,,, | Performed by: COLON & RECTAL SURGERY

## 2025-01-14 PROCEDURE — 85025 COMPLETE CBC W/AUTO DIFF WBC: CPT | Performed by: HOSPITALIST

## 2025-01-14 PROCEDURE — 25000003 PHARM REV CODE 250: Performed by: HOSPITALIST

## 2025-01-14 PROCEDURE — 11000001 HC ACUTE MED/SURG PRIVATE ROOM

## 2025-01-14 RX ORDER — POTASSIUM CHLORIDE 750 MG/1
30 TABLET, EXTENDED RELEASE ORAL ONCE
Status: COMPLETED | OUTPATIENT
Start: 2025-01-14 | End: 2025-01-14

## 2025-01-14 RX ORDER — MORPHINE SULFATE 4 MG/ML
4 INJECTION, SOLUTION INTRAMUSCULAR; INTRAVENOUS
Status: DISCONTINUED | OUTPATIENT
Start: 2025-01-14 | End: 2025-01-16

## 2025-01-14 RX ORDER — MORPHINE SULFATE 4 MG/ML
2 INJECTION, SOLUTION INTRAMUSCULAR; INTRAVENOUS
Status: DISCONTINUED | OUTPATIENT
Start: 2025-01-14 | End: 2025-01-16

## 2025-01-14 RX ORDER — TRAZODONE HYDROCHLORIDE 50 MG/1
50 TABLET ORAL NIGHTLY PRN
Status: DISCONTINUED | OUTPATIENT
Start: 2025-01-14 | End: 2025-01-16 | Stop reason: HOSPADM

## 2025-01-14 RX ADMIN — SODIUM CHLORIDE: 9 INJECTION, SOLUTION INTRAVENOUS at 02:01

## 2025-01-14 RX ADMIN — MELATONIN TAB 3 MG 6 MG: 3 TAB at 07:01

## 2025-01-14 RX ADMIN — MORPHINE SULFATE 2 MG: 4 INJECTION INTRAVENOUS at 07:01

## 2025-01-14 RX ADMIN — POTASSIUM CHLORIDE 30 MEQ: 750 TABLET, EXTENDED RELEASE ORAL at 02:01

## 2025-01-14 RX ADMIN — PIPERACILLIN SODIUM AND TAZOBACTAM SODIUM 4.5 G: 4; .5 INJECTION, POWDER, LYOPHILIZED, FOR SOLUTION INTRAVENOUS at 08:01

## 2025-01-14 RX ADMIN — METRONIDAZOLE 500 MG: 500 INJECTION, SOLUTION INTRAVENOUS at 02:01

## 2025-01-14 RX ADMIN — MORPHINE SULFATE 2 MG: 4 INJECTION INTRAVENOUS at 12:01

## 2025-01-14 RX ADMIN — METRONIDAZOLE 500 MG: 500 INJECTION, SOLUTION INTRAVENOUS at 06:01

## 2025-01-14 RX ADMIN — MORPHINE SULFATE 2 MG: 4 INJECTION INTRAVENOUS at 03:01

## 2025-01-14 NOTE — SUBJECTIVE & OBJECTIVE
Interval History:  Continues with right lower quadrant abdomen.  Denies nausea or vomiting.    Medications:  Continuous Infusions:   0.9% NaCl   Intravenous Continuous 75 mL/hr at 01/13/25 2001 New Bag at 01/13/25 2001     Scheduled Meds:   cefTRIAXone (Rocephin) IV (PEDS and ADULTS)  2 g Intravenous Q24H    metroNIDAZOLE IV (PEDS and ADULTS)  500 mg Intravenous Q8H    potassium chloride  30 mEq Oral Once     PRN Meds:  Current Facility-Administered Medications:     acetaminophen, 650 mg, Oral, Q8H PRN    dextrose 50%, 12.5 g, Intravenous, PRN    dextrose 50%, 25 g, Intravenous, PRN    glucagon (human recombinant), 1 mg, Intramuscular, PRN    glucose, 16 g, Oral, PRN    glucose, 24 g, Oral, PRN    melatonin, 6 mg, Oral, Nightly PRN    morphine, 2 mg, Intravenous, Q3H PRN    morphine, 4 mg, Intravenous, Q3H PRN    naloxone, 0.02 mg, Intravenous, PRN    ondansetron, 4 mg, Intravenous, Q6H PRN    sodium chloride 0.9%, 10 mL, Intravenous, Q12H PRN    traZODone, 50 mg, Oral, Nightly PRN     Review of patient's allergies indicates:  No Known Allergies  Objective:     Vital Signs (Most Recent):  Temp: 98.6 °F (37 °C) (01/13/25 1220)  Pulse: (!) 52 (01/14/25 1200)  Resp: 20 (01/14/25 1230)  BP: 104/66 (01/14/25 1200)  SpO2: 97 % (01/14/25 1200) Vital Signs (24h Range):  Pulse:  [52-91] 52  Resp:  [13-20] 20  SpO2:  [97 %-100 %] 97 %  BP: (104-146)/(60-92) 104/66     Weight: 80 kg (176 lb 5.9 oz)  Body mass index is 24.6 kg/m².    Intake/Output - Last 3 Shifts         01/12 0700  01/13 0659 01/13 0700  01/14 0659 01/14 0700  01/15 0659    I.V. (mL/kg)  1000 (12.5)     IV Piggyback  200 100    Total Intake(mL/kg)  1200 (15) 100 (1.3)    Net  +1200 +100                    Physical Exam  Constitutional:       General: He is not in acute distress.     Appearance: Normal appearance.   HENT:      Head: Normocephalic and atraumatic.   Eyes:      Extraocular Movements: Extraocular movements intact.      Conjunctiva/sclera:  Conjunctivae normal.   Cardiovascular:      Rate and Rhythm: Bradycardia present.   Pulmonary:      Effort: Pulmonary effort is normal.   Abdominal:      Comments: Soft, nondistended, +TTP RLQ; no rebound or guarding; no previous incisions   Musculoskeletal:         General: No swelling, tenderness, deformity or signs of injury. Normal range of motion.   Skin:     General: Skin is warm and dry.      Coloration: Skin is not jaundiced.   Neurological:      General: No focal deficit present.      Mental Status: He is alert and oriented to person, place, and time.   Psychiatric:         Mood and Affect: Mood normal.         Behavior: Behavior normal.         Thought Content: Thought content normal.          Significant Labs:  I have reviewed all pertinent lab results within the past 24 hours.  CBC:   Recent Labs   Lab 01/14/25  0510   WBC 10.84   RBC 4.56*   HGB 13.9*   HCT 41.3      MCV 91   MCH 30.5   MCHC 33.7     BMP:   Recent Labs   Lab 01/14/25  0510   GLU 87      K 3.7      CO2 18*   BUN 25*   CREATININE 1.6*   CALCIUM 8.8   MG 2.2     CMP:   Recent Labs   Lab 01/14/25  0510   GLU 87   CALCIUM 8.8   ALBUMIN 3.3*   PROT 6.7      K 3.7   CO2 18*      BUN 25*   CREATININE 1.6*   ALKPHOS 66   ALT 8*   AST 9*   BILITOT 0.5     LFTs:   Recent Labs   Lab 01/14/25  0510   ALT 8*   AST 9*   ALKPHOS 66   BILITOT 0.5   PROT 6.7   ALBUMIN 3.3*     Significant Diagnostics:  I have reviewed all pertinent imaging results/findings within the past 24 hours.

## 2025-01-14 NOTE — PLAN OF CARE
[facility] - Emergency Dept.  Initial Discharge Assessment       Primary Care Provider: No primary care provider on file.    Admission Diagnosis: Diverticulitis of colon with perforation [K57.20]    Admission Date: 1/13/2025  Expected Discharge Date:     Transition of Care Barriers: (P) None    Payor: BLUE CROSS BLUE SHIELD / Plan: BCBS ALL OUT OF STATE / Product Type: PPO /     Extended Emergency Contact Information  Primary Emergency Contact: rema lr  Mobile Phone: [phone]  Relation: Significant other    Discharge Plan A: (P) Home         [facility] [address]  [address] [facility] [address] [address]  Phone: [phone] Fax: [phone]      Initial Assessment (most recent)       Adult Discharge Assessment - 01/14/25 1401          Discharge Assessment    Assessment Type Discharge Planning Assessment     Confirmed/corrected address, phone number and insurance Yes     Confirmed Demographics Correct on Facesheet     Source of Information patient     When was your last doctors appointment? 01/13/25 (P)      Does patient/caregiver understand observation status Yes (P)      Communicated FARRUKH with patient/caregiver No (P)      Reason For Admission Diverticulitis (P)      People in Home significant other (P)      Facility Arrived From: Home (P)      Do you expect to return to your current living situation? Yes (P)      Do you have help at home or someone to help you manage your care at home? Yes (P)      Who are your caregiver(s) and their phone number(s)? Remageovanna Lr- significant other [phone] (P)      Prior to hospitilization cognitive status: Alert/Oriented (P)      Current cognitive status: Alert/Oriented (P)      Walking or Climbing Stairs Difficulty no (P)      Dressing/Bathing Difficulty no (P)      Home Accessibility wheelchair accessible (P)      Home Layout Able to live on 1st floor (P)      Equipment Currently Used at Home none (P)      Readmission within 30  days? No (P)      Patient currently being followed by outpatient case management? No (P)      Do you currently have service(s) that help you manage your care at home? No (P)      Do you take prescription medications? Yes (P)      Do you have prescription coverage? Yes (P)      Coverage [facility] SHIELD - [facility] ALL OUT OF STATE (P)      Do you have any problems affording any of your prescribed medications? No (P)      Is the patient taking medications as prescribed? yes (P)      Who is going to help you get home at discharge? Karigeovanna Lr- significant other [phone] (P)      How do you get to doctors appointments? car, drives self (P)      Are you on dialysis? No (P)      Do you take coumadin? No (P)      Discharge Plan A Home (P)      DME Needed Upon Discharge  none (P)      Discharge Plan discussed with: Patient (P)      Transition of Care Barriers None (P)         Physical Activity    On average, how many days per week do you engage in moderate to strenuous exercise (like a brisk walk)? 0 days (P)      On average, how many minutes do you engage in exercise at this level? 0 min (P)         Financial Resource Strain    How hard is it for you to pay for the very basics like food, housing, medical care, and heating? Not hard at all (P)         Housing Stability    In the last 12 months, was there a time when you were not able to pay the mortgage or rent on time? No (P)      At any time in the past 12 months, were you homeless or living in a shelter (including now)? No (P)         Transportation Needs    Has the lack of transportation kept you from medical appointments, meetings, work or from getting things needed for daily living? No (P)         Food Insecurity    Within the past 12 months, you worried that your food would run out before you got the money to buy more. Never true (P)      Within the past 12 months, the food you bought just didn't last and you didn't have money to get more. Never true (P)          Stress    Do you feel stress - tense, restless, nervous, or anxious, or unable to sleep at night because your mind is troubled all the time - these days? Not at all (P)         Social Isolation    How often do you feel lonely or isolated from those around you?  Never (P)         Alcohol Use    Q1: How often do you have a drink containing alcohol? Never (P)      Q2: How many drinks containing alcohol do you have on a typical day when you are drinking? Patient does not drink (P)      Q3: How often do you have six or more drinks on one occasion? Never (P)         Utilities    In the past 12 months has the electric, gas, oil, or water company threatened to shut off services in your home? No (P)         Health Literacy    How often do you need to have someone help you when you read instructions, pamphlets, or other written material from your doctor or pharmacy? Never (P)         OTHER    Name(s) of People in Home Kari Lr- significant other [phone] (P)                    Sw spoke with patient at bedside to complete initial assessment. Patient currently lives with his significant other. Patient does not use any medical equipment at home. Patient is not receiving  services. Patient is not receiving any services through the Coumadin clinic. Patient is not receiving any dialayis services. Patient's is current with his PCP . Patient uses the [facility] to revieve his medications. When asked about the [facility] patient denies having any current barriers. No needs at this time.

## 2025-01-14 NOTE — HPI
40 y/o male with PMHx of anxiety disorder, depression, tobacco use disorder, marijuana use disorder presents to the ER with complaints of RLQ abdominal pain, nausea, vomiting, decreased PO intake for the past 4 days. He was seen a UC, prescribed cipro, flagyl, and lactulose for diverticular disease and constipation. He reports worsening of symptoms, unable to tolerate PO intake, decreased UOP, fevers/chills. In ER, lab signficiant for leukocyotosis (WBC 16k), BRENDON (BUN/Cr 27/2.3); CT A/P with diverticulitis, possible focal perforation. Surgery consulted, recommended conservative management. Admit as inpatient.

## 2025-01-14 NOTE — ASSESSMENT & PLAN NOTE
This patient does have evidence of infective focus  My overall impression is sepsis.  Source: Abdominal  Antibiotics given-   Antibiotics (72h ago, onward)      Start     Stop Route Frequency Ordered    01/13/25 2200  metronidazole IVPB 500 mg  (Intra-abdominal Infection)         01/19/25 2159 IV Every 8 hours 01/13/25 1959 01/13/25 2100  cefTRIAXone injection 2 g  (Intra-abdominal Infection)         01/19/25 2059 IV Every 24 hours (non-standard times) 01/13/25 1959          Latest lactate reviewed-  Recent Labs   Lab 01/13/25  1637   LACTATE 2.2     Organ dysfunction indicated by Acute kidney injury    Fluid challenge Not needed - patient is not hypotensive      Post- resuscitation assessment No - Post resuscitation assessment not needed       Will Not start Pressors- Levophed for MAP of 65  Source control achieved by: IV antibiotics

## 2025-01-14 NOTE — ASSESSMENT & PLAN NOTE
BRENDON is likely due to pre-renal azotemia due to intravascular volume depletion. Baseline creatinine is  0.8 . Most recent creatinine and eGFR are listed below.  Recent Labs     01/13/25  1348 01/14/25  0510   CREATININE 2.3* 1.6*   EGFRNORACEVR 36* 56*        Plan  - BRENDON is improving. Will adjust treatment as follows: IV fluids  - Avoid nephrotoxins and renally dose meds for GFR listed above  - Monitor urine output, serial BMP, and adjust therapy as needed

## 2025-01-14 NOTE — H&P
[facility] - Emergency Dept.  [facility] Medicine  History & Physical    Patient Name: German Rabago  MRN: [id number]  Patient Class: IP- Inpatient  Admission Date: 1/13/2025  Attending Physician: Nikos Edgar MD   Primary Care Provider: No primary care provider on file.         Patient information was obtained from patient, past medical records, and ER records.     Subjective:     Principal Problem:Diverticulitis    Chief Complaint:   Chief Complaint   Patient presents with    Abdominal Pain     Pt states he is having problems with his diverticulitis for the past 3 days. Pt states he has been having nausea, vomiting, diarrhea and abd pain.          HPI: 40 y/o male with PMHx of anxiety disorder, depression, tobacco use disorder, marijuana use disorder presents to the ER with complaints of RLQ abdominal pain, nausea, vomiting, decreased PO intake for the past 4 days. He was seen a UC, prescribed cipro, flagyl, and lactulose for diverticular disease and constipation. He reports worsening of symptoms, unable to tolerate PO intake, decreased UOP, fevers/chills. In ER, lab signficiant for leukocyotosis (WBC 16k), BRENDON (BUN/Cr 27/2.3); CT A/P with diverticulitis, possible focal perforation. Surgery consulted, recommended conservative management. Admit as inpatient.    Past Medical History:   Diagnosis Date    Diverticular disease of large intestine without perforation or abscess        Past Surgical History:   Procedure Laterality Date    SMALL INTESTINE SURGERY  Can't remember    Long time ago at [facility] Long thought I had a hernia but it was a fatty deposit on small intestine       Review of patient's allergies indicates:  No Known Allergies    No current facility-administered medications on file prior to encounter.     Current Outpatient Medications on File Prior to Encounter   Medication Sig    ciprofloxacin HCl (CIPRO) 500 MG tablet Take 500 mg by mouth every 12 (twelve) hours.    ibuprofen (ADVIL,MOTRIN) 800 MG tablet  Take 1 tablet (800 mg total) by mouth 3 (three) times daily as needed for Pain.    metroNIDAZOLE (FLAGYL) 500 MG tablet Take 500 mg by mouth every 12 (twelve) hours.    [DISCONTINUED] orphenadrine (NORFLEX) 100 mg tablet Take 1 tablet (100 mg total) by mouth 2 (two) times daily as needed for Muscle spasms or Pain.     Family History       Problem Relation (Age of Onset)    Depression Mother          Tobacco Use    Smoking status: Some Days     Current packs/day: 0.00     Average packs/day: 0.5 packs/day for 15.0 years (7.5 ttl pk-yrs)     Types: Cigarettes     Last attempt to quit: 1/15/2019     Years since quittin.0    Smokeless tobacco: Never    Tobacco comments:     Since    Substance and Sexual Activity    Alcohol use: Never    Drug use: Not Currently     Frequency: 7.0 times per week     Types: Marijuana     Comment: For mental health use    Sexual activity: Not Currently     Partners: Female     Review of Systems   All other systems reviewed and are negative.    Objective:     Vital Signs (Most Recent):  Temp: 98.6 °F (37 °C) (25 1220)  Pulse: 62 (25 1800)  Resp: 17 (25 1800)  BP: 106/65 (25 1800)  SpO2: 99 % (25 1800) Vital Signs (24h Range):  Temp:  [97.1 °F (36.2 °C)-98.6 °F (37 °C)] 98.6 °F (37 °C)  Pulse:  [] 62  Resp:  [16-19] 17  SpO2:  [98 %-100 %] 99 %  BP: (106-140)/(58-94) 106/65     Weight: 80 kg (176 lb 5.9 oz)  Body mass index is 24.6 kg/m².     Physical Exam  Vitals and nursing note reviewed.   Constitutional:       General: He is not in acute distress.     Appearance: Normal appearance. He is normal weight. He is ill-appearing.   HENT:      Head: Normocephalic and atraumatic.      Nose: Nose normal.      Mouth/Throat:      Mouth: Mucous membranes are dry.      Pharynx: Oropharynx is clear.   Eyes:      Extraocular Movements: Extraocular movements intact.      Pupils: Pupils are equal, round, and reactive to light.   Cardiovascular:      Rate and Rhythm:  Tachycardia present.      Pulses: Normal pulses.      Heart sounds: Normal heart sounds.   Pulmonary:      Effort: Pulmonary effort is normal. No respiratory distress.      Breath sounds: Normal breath sounds. No wheezing, rhonchi or rales.   Abdominal:      General: Abdomen is flat. Bowel sounds are normal. There is no distension.      Palpations: Abdomen is soft.      Tenderness: There is abdominal tenderness (RLQ). There is no guarding.   Skin:     Coloration: Skin is pale.   Neurological:      General: No focal deficit present.      Mental Status: He is alert and oriented to person, place, and time.   Psychiatric:         Mood and Affect: Mood normal.         Behavior: Behavior normal.              CRANIAL NERVES     CN III, IV, VI   Pupils are equal, round, and reactive to light.       Significant Labs: All pertinent labs within the past 24 hours have been reviewed.  Recent Lab Results         01/13/25  1637   01/13/25  1410   01/13/25  1348        Albumin     4.2       ALP     84       ALT     9       Anion Gap     14       Appearance, UA   Clear         AST     14       Bacteria, UA   Rare         Baso #     0.06       Basophil %     0.4       Bilirubin (UA)   Negative         BILIRUBIN TOTAL     0.7  Comment: For infants and newborns, interpretation of results should be based  on gestational age, weight and in agreement with clinical  observations.    Premature Infant recommended reference ranges:  Up to 24 hours.............<8.0 mg/dL  Up to 48 hours............<12.0 mg/dL  3-5 days..................<15.0 mg/dL  6-29 days.................<15.0 mg/dL         BUN     27       Calcium     10.2       Chloride     101       CO2     23       Color, UA   Yellow         Creatinine     2.3       Differential Method     Automated       eGFR     36       Eos #     0.1       Eos %     0.3       Glucose     96       Glucose, UA   Negative         Gran # (ANC)     11.2       Gran %     71.2       Hematocrit     48.5        Hemoglobin     16.3       Hyaline Casts, UA   3         Immature Grans (Abs)     0.09  Comment: Mild elevation in immature granulocytes is non specific and   can be seen in a variety of conditions including stress response,   acute inflammation, trauma and pregnancy. Correlation with other   laboratory and clinical findings is essential.         Immature Granulocytes     0.6       Ketones, UA   2+         Lactic Acid Level 2.2  Comment: Falsely low lactic acid results can be found in samples   containing >=13.0 mg/dL total bilirubin and/or >=3.5 mg/dL   direct bilirubin.             Leukocyte Esterase, UA   Trace         Lipase     67       Lymph #     2.8       Lymph %     18.1       MCH     29.8       MCHC     33.6       MCV     89       Microscopic Comment   SEE COMMENT  Comment: Other formed elements not mentioned in the report are not   present in the microscopic examination.            Mono #     1.5       Mono %     9.4       MPV     10.0       NITRITE UA   Negative         nRBC     0       Blood, UA   1+         pH, UA   6.0         Platelet Count     452       Potassium     3.8       PROTEIN TOTAL     8.6       Protein, UA   1+  Comment: Recommend a 24 hour urine protein or a urine   protein/creatinine ratio if globulin induced proteinuria is  clinically suspected.           RBC     5.47       RBC, UA   4         RDW     13.3       Sodium     138       Spec Grav UA   1.015         Specimen UA   Urine, Clean Catch         UROBILINOGEN UA   Negative         WBC, UA   9         WBC     15.67               Significant Imaging: I have reviewed all pertinent imaging results/findings within the past 24 hours.    CT Abdomen Pelvis  Without Contrast   Final Result      Findings probably representing focally perforated diverticulitis on a background of acute on chronic inflammatory small bowel disease.  Alternatively the inflamed small bowel may be totally secondary to the perforated colonic diverticulum.  Fat  "within the wall of the distal small bowel suggests a history of infectious and/or inflammatory distal ileitis.  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  No drainable fluid collection.  Colonoscopy is recommended when clinically appropriate to rule out underlying colonic mass.  Consider surgical consultation to aid in management.      Findings communicated to MARSHALL Rivero by epic christos on January 13, 2025 at 15:43.         Electronically signed by: Raymon Mauricio   Date:    01/13/2025   Time:    15:44          Assessment/Plan:     * Diverticulitis  CT - "focally perforated diverticulitis on a background of acute on chronic inflammatory small bowel disease."  IV antibiotics  IV fluids  Multimodal pain control  Keep NPO  Surgery following    Sepsis  This patient does have evidence of infective focus  My overall impression is sepsis.  Source: Abdominal  Antibiotics given-   Antibiotics (72h ago, onward)      Start     Stop Route Frequency Ordered    01/13/25 2200  metronidazole IVPB 500 mg  (Intra-abdominal Infection)         01/19/25 2159 IV Every 8 hours 01/13/25 1959 01/13/25 2100  cefTRIAXone injection 2 g  (Intra-abdominal Infection)         01/19/25 2059 IV Every 24 hours (non-standard times) 01/13/25 1959          Latest lactate reviewed-  Recent Labs   Lab 01/13/25  1637   LACTATE 2.2     Organ dysfunction indicated by Acute kidney injury    Fluid challenge Not needed - patient is not hypotensive      Post- resuscitation assessment No - Post resuscitation assessment not needed       Will Not start Pressors- Levophed for MAP of 65  Source control achieved by: IV antibiotics    BRENDON (acute kidney injury)  BRNEDON is likely due to pre-renal azotemia due to intravascular volume depletion. Baseline creatinine is  0.8 . Most recent creatinine and eGFR are listed below.  Recent Labs     01/13/25  1348   CREATININE 2.3*   EGFRNORACEVR 36*      Plan  - BRENDON is worsening. Will adjust treatment as follows: IV " fluids  - Avoid nephrotoxins and renally dose meds for GFR listed above  - Monitor urine output, serial BMP, and adjust therapy as needed      VTE Risk Mitigation (From admission, onward)           Ordered     IP VTE LOW RISK PATIENT  Once         01/13/25 1935     Place sequential compression device  Until discontinued         01/13/25 1935                                    Nikos Edgar MD  Department of Hospital Medicine  [facility] - Emergency Dept.

## 2025-01-14 NOTE — HOSPITAL COURSE
40 y/o male, comorbid conditions include anxiety d/o, depression, tobacco use d/o, THC use, to ED with RLQ pain, N/V, and decreased PO tolerance over past 4 days. Seen previously at  and treated with abx and lactulose for constipation. When he was unable to tolerate PO intake and had decr UOP, fever/chills he sought ER care. CT - positive for diverticulitis, possible focal perf. Surgery consulted and recommended: no acute surgical intervention. Seen by colorectal surgery recommending conservative management, bowel rest with possible OP surgical intervention at later date.    12/15/2025  Abx switched to Augmentin 500-125 mg PO TID per colorectal surgery on secure chat. Diet clear liquids.Pain controlled.    12/16/2025  Diet advanced to Regular and tolerating. OK for discharge by GI. Minimal residual RLQ abd pain to palpation, denies n/v. Discharged home with home meds, add Macrobid 500 mg PO TID for 5 days  to complete a 7 day course, and Norco 5-325 mg for pain. I have seen and evaluated this patients and he is stable for discharge.

## 2025-01-14 NOTE — ASSESSMENT & PLAN NOTE
BRENDON is likely due to pre-renal azotemia due to intravascular volume depletion. Baseline creatinine is  0.8 . Most recent creatinine and eGFR are listed below.  Recent Labs     01/13/25  1348   CREATININE 2.3*   EGFRNORACEVR 36*      Plan  - BRENDON is worsening. Will adjust treatment as follows: IV fluids  - Avoid nephrotoxins and renally dose meds for GFR listed above  - Monitor urine output, serial BMP, and adjust therapy as needed

## 2025-01-14 NOTE — PROGRESS NOTES
[facility] - Emergency Dept.  Colorectal Surgery  Progress Note    Subjective:     Interval History:  Continues with right lower quadrant abdomen.  Denies nausea or vomiting.    Medications:  Continuous Infusions:   0.9% NaCl   Intravenous Continuous 75 mL/hr at 01/13/25 2001 New Bag at 01/13/25 2001     Scheduled Meds:   cefTRIAXone (Rocephin) IV (PEDS and ADULTS)  2 g Intravenous Q24H    metroNIDAZOLE IV (PEDS and ADULTS)  500 mg Intravenous Q8H    potassium chloride  30 mEq Oral Once     PRN Meds:  Current Facility-Administered Medications:     acetaminophen, 650 mg, Oral, Q8H PRN    dextrose 50%, 12.5 g, Intravenous, PRN    dextrose 50%, 25 g, Intravenous, PRN    glucagon (human recombinant), 1 mg, Intramuscular, PRN    glucose, 16 g, Oral, PRN    glucose, 24 g, Oral, PRN    melatonin, 6 mg, Oral, Nightly PRN    morphine, 2 mg, Intravenous, Q3H PRN    morphine, 4 mg, Intravenous, Q3H PRN    naloxone, 0.02 mg, Intravenous, PRN    ondansetron, 4 mg, Intravenous, Q6H PRN    sodium chloride 0.9%, 10 mL, Intravenous, Q12H PRN    traZODone, 50 mg, Oral, Nightly PRN     Review of patient's allergies indicates:  No Known Allergies  Objective:     Vital Signs (Most Recent):  Temp: 98.6 °F (37 °C) (01/13/25 1220)  Pulse: (!) 52 (01/14/25 1200)  Resp: 20 (01/14/25 1230)  BP: 104/66 (01/14/25 1200)  SpO2: 97 % (01/14/25 1200) Vital Signs (24h Range):  Pulse:  [52-91] 52  Resp:  [13-20] 20  SpO2:  [97 %-100 %] 97 %  BP: (104-146)/(60-92) 104/66     Weight: 80 kg (176 lb 5.9 oz)  Body mass index is 24.6 kg/m².    Intake/Output - Last 3 Shifts         01/12 0700  01/13 0659 01/13 0700  01/14 0659 01/14 0700  01/15 0659    I.V. (mL/kg)  1000 (12.5)     IV Piggyback  200 100    Total Intake(mL/kg)  1200 (15) 100 (1.3)    Net  +1200 +100                    Physical Exam  Constitutional:       General: He is not in acute distress.     Appearance: Normal appearance.   HENT:      Head: Normocephalic and atraumatic.   Eyes:       Extraocular Movements: Extraocular movements intact.      Conjunctiva/sclera: Conjunctivae normal.   Cardiovascular:      Rate and Rhythm: Bradycardia present.   Pulmonary:      Effort: Pulmonary effort is normal.   Abdominal:      Comments: Soft, nondistended, +TTP RLQ; no rebound or guarding; no previous incisions   Musculoskeletal:         General: No swelling, tenderness, deformity or signs of injury. Normal range of motion.   Skin:     General: Skin is warm and dry.      Coloration: Skin is not jaundiced.   Neurological:      General: No focal deficit present.      Mental Status: He is alert and oriented to person, place, and time.   Psychiatric:         Mood and Affect: Mood normal.         Behavior: Behavior normal.         Thought Content: Thought content normal.          Significant Labs:  I have reviewed all pertinent lab results within the past 24 hours.  CBC:   Recent Labs   Lab 01/14/25  0510   WBC 10.84   RBC 4.56*   HGB 13.9*   HCT 41.3      MCV 91   MCH 30.5   MCHC 33.7     BMP:   Recent Labs   Lab 01/14/25  0510   GLU 87      K 3.7      CO2 18*   BUN 25*   CREATININE 1.6*   CALCIUM 8.8   MG 2.2     CMP:   Recent Labs   Lab 01/14/25  0510   GLU 87   CALCIUM 8.8   ALBUMIN 3.3*   PROT 6.7      K 3.7   CO2 18*      BUN 25*   CREATININE 1.6*   ALKPHOS 66   ALT 8*   AST 9*   BILITOT 0.5     LFTs:   Recent Labs   Lab 01/14/25  0510   ALT 8*   AST 9*   ALKPHOS 66   BILITOT 0.5   PROT 6.7   ALBUMIN 3.3*     Significant Diagnostics:  I have reviewed all pertinent imaging results/findings within the past 24 hours.  Assessment/Plan:     * Diverticulitis  38yo M with sigmoid diverticulitis vs small bowel inflammation concerning for IBD    - No acute surgical intervention required at this time.  Would ideally like to cool this area down with IV antibiotics and bowel rest and avoid surgical intervention during this hospitalization.  Discussed with the patient that if he requires  surgery during this hospitalization and increases his risk for ostomy formation.  He will likely require surgical resection eventually but we would hopefully perform this as an outpatient and he is amenable to this plan.  - continue IV antibiotics time for 1 more day.  If continues to have no leukocytosis and improving pain, can convert to p.o. antibiotics tomorrow.  - NPO. Okay for clear liquids for dinner tonight and monitor for tolerance  - Rest of care per primary team    BRENDON (acute kidney injury)  - Management per primary team       Ignacio Aldana MD  Colorectal Surgery  [facility] - Emergency Dept.

## 2025-01-14 NOTE — SUBJECTIVE & OBJECTIVE
Past Medical History:   Diagnosis Date    Diverticular disease of large intestine without perforation or abscess        Past Surgical History:   Procedure Laterality Date    SMALL INTESTINE SURGERY  Can't remember    Long time ago at [facility] thought I had a hernia but it was a fatty deposit on small intestine       Review of patient's allergies indicates:  No Known Allergies    No current facility-administered medications on file prior to encounter.     Current Outpatient Medications on File Prior to Encounter   Medication Sig    ciprofloxacin HCl (CIPRO) 500 MG tablet Take 500 mg by mouth every 12 (twelve) hours.    ibuprofen (ADVIL,MOTRIN) 800 MG tablet Take 1 tablet (800 mg total) by mouth 3 (three) times daily as needed for Pain.    metroNIDAZOLE (FLAGYL) 500 MG tablet Take 500 mg by mouth every 12 (twelve) hours.    [DISCONTINUED] orphenadrine (NORFLEX) 100 mg tablet Take 1 tablet (100 mg total) by mouth 2 (two) times daily as needed for Muscle spasms or Pain.     Family History       Problem Relation (Age of Onset)    Depression Mother          Tobacco Use    Smoking status: Some Days     Current packs/day: 0.00     Average packs/day: 0.5 packs/day for 15.0 years (7.5 ttl pk-yrs)     Types: Cigarettes     Last attempt to quit: 1/15/2019     Years since quittin.0    Smokeless tobacco: Never    Tobacco comments:     Since 17   Substance and Sexual Activity    Alcohol use: Never    Drug use: Not Currently     Frequency: 7.0 times per week     Types: Marijuana     Comment: For mental health use    Sexual activity: Not Currently     Partners: Female     Review of Systems   All other systems reviewed and are negative.    Objective:     Vital Signs (Most Recent):  Temp: 98.6 °F (37 °C) (25 1220)  Pulse: 62 (25 1800)  Resp: 17 (25 1800)  BP: 106/65 (25 1800)  SpO2: 99 % (25 1800) Vital Signs (24h Range):  Temp:  [97.1 °F (36.2 °C)-98.6 °F (37 °C)] 98.6 °F (37 °C)  Pulse:  []  62  Resp:  [16-19] 17  SpO2:  [98 %-100 %] 99 %  BP: (106-140)/(58-94) 106/65     Weight: 80 kg (176 lb 5.9 oz)  Body mass index is 24.6 kg/m².     Physical Exam  Vitals and nursing note reviewed.   Constitutional:       General: He is not in acute distress.     Appearance: Normal appearance. He is normal weight. He is ill-appearing.   HENT:      Head: Normocephalic and atraumatic.      Nose: Nose normal.      Mouth/Throat:      Mouth: Mucous membranes are dry.      Pharynx: Oropharynx is clear.   Eyes:      Extraocular Movements: Extraocular movements intact.      Pupils: Pupils are equal, round, and reactive to light.   Cardiovascular:      Rate and Rhythm: Tachycardia present.      Pulses: Normal pulses.      Heart sounds: Normal heart sounds.   Pulmonary:      Effort: Pulmonary effort is normal. No respiratory distress.      Breath sounds: Normal breath sounds. No wheezing, rhonchi or rales.   Abdominal:      General: Abdomen is flat. Bowel sounds are normal. There is no distension.      Palpations: Abdomen is soft.      Tenderness: There is abdominal tenderness (RLQ). There is no guarding.   Skin:     Coloration: Skin is pale.   Neurological:      General: No focal deficit present.      Mental Status: He is alert and oriented to person, place, and time.   Psychiatric:         Mood and Affect: Mood normal.         Behavior: Behavior normal.              CRANIAL NERVES     CN III, IV, VI   Pupils are equal, round, and reactive to light.       Significant Labs: All pertinent labs within the past 24 hours have been reviewed.  Recent Lab Results         01/13/25  1637   01/13/25  1410   01/13/25  1348        Albumin     4.2       ALP     84       ALT     9       Anion Gap     14       Appearance, UA   Clear         AST     14       Bacteria, UA   Rare         Baso #     0.06       Basophil %     0.4       Bilirubin (UA)   Negative         BILIRUBIN TOTAL     0.7  Comment: For infants and newborns, interpretation of  results should be based  on gestational age, weight and in agreement with clinical  observations.    Premature Infant recommended reference ranges:  Up to 24 hours.............<8.0 mg/dL  Up to 48 hours............<12.0 mg/dL  3-5 days..................<15.0 mg/dL  6-29 days.................<15.0 mg/dL         BUN     27       Calcium     10.2       Chloride     101       CO2     23       Color, UA   Yellow         Creatinine     2.3       Differential Method     Automated       eGFR     36       Eos #     0.1       Eos %     0.3       Glucose     96       Glucose, UA   Negative         Gran # (ANC)     11.2       Gran %     71.2       Hematocrit     48.5       Hemoglobin     16.3       Hyaline Casts, UA   3         Immature Grans (Abs)     0.09  Comment: Mild elevation in immature granulocytes is non specific and   can be seen in a variety of conditions including stress response,   acute inflammation, trauma and pregnancy. Correlation with other   laboratory and clinical findings is essential.         Immature Granulocytes     0.6       Ketones, UA   2+         Lactic Acid Level 2.2  Comment: Falsely low lactic acid results can be found in samples   containing >=13.0 mg/dL total bilirubin and/or >=3.5 mg/dL   direct bilirubin.             Leukocyte Esterase, UA   Trace         Lipase     67       Lymph #     2.8       Lymph %     18.1       MCH     29.8       MCHC     33.6       MCV     89       Microscopic Comment   SEE COMMENT  Comment: Other formed elements not mentioned in the report are not   present in the microscopic examination.            Mono #     1.5       Mono %     9.4       MPV     10.0       NITRITE UA   Negative         nRBC     0       Blood, UA   1+         pH, UA   6.0         Platelet Count     452       Potassium     3.8       PROTEIN TOTAL     8.6       Protein, UA   1+  Comment: Recommend a 24 hour urine protein or a urine   protein/creatinine ratio if globulin induced proteinuria  is  clinically suspected.           RBC     5.47       RBC, UA   4         RDW     13.3       Sodium     138       Spec Grav UA   1.015         Specimen UA   Urine, Clean Catch         UROBILINOGEN UA   Negative         WBC, UA   9         WBC     15.67               Significant Imaging: I have reviewed all pertinent imaging results/findings within the past 24 hours.    CT Abdomen Pelvis  Without Contrast   Final Result      Findings probably representing focally perforated diverticulitis on a background of acute on chronic inflammatory small bowel disease.  Alternatively the inflamed small bowel may be totally secondary to the perforated colonic diverticulum.  Fat within the wall of the distal small bowel suggests a history of infectious and/or inflammatory distal ileitis.  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  No drainable fluid collection.  Colonoscopy is recommended when clinically appropriate to rule out underlying colonic mass.  Consider surgical consultation to aid in management.      Findings communicated to MARSHALL Rivero by epic chat on January 13, 2025 at 15:43.         Electronically signed by: Raymon Mauricio   Date:    01/13/2025   Time:    15:44

## 2025-01-14 NOTE — ED NOTES
Received report on patient, assumed care. Patient AAOX4, respirations even/unlabored. CM lead 2 NSR without ectopy. Patient denies any pain at present. Bed in low position, side rails up x 2, call light in reach.

## 2025-01-14 NOTE — PHARMACY MED REC
"Admission Medication History     The home medication history was taken by Ena Mays.    You may go to "Admission" then "Reconcile Home Medications" tabs to review and/or act upon these items.     The home medication list has been updated by the Pharmacy department.   Please read ALL comments highlighted in yellow.   Please address this information as you see fit.    Feel free to contact us if you have any questions or require assistance.        Medications listed below were obtained from: Patient/family and Analytic software- [employer]      [facility] REC COMPLETED:     Ena Mays  [id number]    Current Outpatient Medications on File Prior to Encounter   Medication Sig Dispense Refill Last Dose/Taking    ciprofloxacin HCl (CIPRO) 500 MG tablet Take 500 mg by mouth every 12 (twelve) hours.   1/12/2025    metroNIDAZOLE (FLAGYL) 500 MG tablet Take 500 mg by mouth every 12 (twelve) hours.   1/12/2025    ibuprofen (ADVIL,MOTRIN) 800 MG tablet Take 1 tablet (800 mg total) by mouth 3 (three) times daily as needed for Pain. 90 tablet 0 1/12/2025                           .          "

## 2025-01-14 NOTE — SUBJECTIVE & OBJECTIVE
Interval History: Held in ER bed. Denies N/V. Tenderness to RLQ with palpation. No acute events overnight.    Review of Systems   Constitutional:  Negative for chills and fever.   HENT:  Negative for congestion and rhinorrhea.    Eyes:  Negative for discharge and visual disturbance.   Respiratory:  Negative for cough and shortness of breath.    Cardiovascular:  Negative for chest pain, palpitations and leg swelling.   Gastrointestinal:  Positive for abdominal pain (RLQ with palpation). Negative for constipation, diarrhea, nausea and vomiting.   Genitourinary:  Negative for difficulty urinating and dysuria.   Musculoskeletal:  Negative for arthralgias, back pain and myalgias.   Skin:  Negative for color change.   Neurological:  Negative for dizziness and headaches.   Psychiatric/Behavioral:  Negative for sleep disturbance.      Objective:     Vital Signs (Most Recent):  Temp: 98.6 °F (37 °C) (01/13/25 1220)  Pulse: 67 (01/14/25 0800)  Resp: 18 (01/14/25 0800)  BP: 118/66 (01/14/25 0800)  SpO2: 99 % (01/14/25 0800) Vital Signs (24h Range):  Temp:  [98.6 °F (37 °C)] 98.6 °F (37 °C)  Pulse:  [55-91] 67  Resp:  [13-19] 18  SpO2:  [97 %-100 %] 99 %  BP: (106-146)/(60-94) 118/66     Weight: 80 kg (176 lb 5.9 oz)  Body mass index is 24.6 kg/m².    Intake/Output Summary (Last 24 hours) at 1/14/2025 1155  Last data filed at 1/14/2025 0745  Gross per 24 hour   Intake 1300 ml   Output --   Net 1300 ml         Physical Exam  Vitals and nursing note reviewed.   Constitutional:       General: He is not in acute distress.     Appearance: He is not ill-appearing, toxic-appearing or diaphoretic.   HENT:      Head: Normocephalic and atraumatic.      Mouth/Throat:      Mouth: Mucous membranes are moist.   Eyes:      General: No scleral icterus.        Right eye: No discharge.         Left eye: No discharge.      Pupils: Pupils are equal, round, and reactive to light.   Cardiovascular:      Rate and Rhythm: Normal rate and regular  rhythm.      Heart sounds: Normal heart sounds.   Pulmonary:      Effort: Pulmonary effort is normal. No respiratory distress.   Abdominal:      General: Bowel sounds are normal.      Tenderness: There is abdominal tenderness.   Musculoskeletal:      Cervical back: No rigidity.      Right lower leg: No edema.      Left lower leg: No edema.   Skin:     General: Skin is warm and dry.      Coloration: Skin is not jaundiced.   Neurological:      Mental Status: He is alert and oriented to person, place, and time. Mental status is at baseline.   Psychiatric:         Mood and Affect: Mood normal.         Behavior: Behavior normal.             Significant Labs: All pertinent labs within the past 24 hours have been reviewed.  Blood Culture:   Recent Labs   Lab 01/13/25  1635 01/13/25  2044 01/13/25 2054   LABBLOO No Growth to date  No Growth to date No Growth to date No Growth to date     BMP:   Recent Labs   Lab 01/14/25  0510   GLU 87      K 3.7      CO2 18*   BUN 25*   CREATININE 1.6*   CALCIUM 8.8   MG 2.2     CBC:   Recent Labs   Lab 01/13/25  1348 01/14/25  0510   WBC 15.67* 10.84   HGB 16.3 13.9*   HCT 48.5 41.3   * 382     Lactic Acid:   Recent Labs   Lab 01/13/25  1637 01/14/25  0510   LACTATE 2.2 0.6     Urine Studies:   Recent Labs   Lab 01/13/25  1410   COLORU Yellow   APPEARANCEUA Clear   PHUR 6.0   SPECGRAV 1.015   PROTEINUA 1+*   GLUCUA Negative   KETONESU 2+*   BILIRUBINUA Negative   OCCULTUA 1+*   NITRITE Negative   UROBILINOGEN Negative   LEUKOCYTESUR Trace*   RBCUA 4   WBCUA 9*   BACTERIA Rare   HYALINECASTS 3*       Significant Imaging: I have reviewed all pertinent imaging results/findings within the past 24 hours.

## 2025-01-14 NOTE — HPI
Upon arrival to the [facility] Gem is a 39 y.o. male with no significant PMH who presented to the ED with an approximately 14 day history of worsening abdominal pain.  We were consulted for perforated diverticulitis.  Pt has a history of diverticulitis with perforation back in May/ [facility] and [facility].  At that time on CT scan there was some concern for fistulization to the adjacent small bowel.   He was treated conservatively with abx.  He then had a colonoscopy in August of 2024 which was normal and did not show any evidence of IBD or fistulization with the small bowel.  He states this episode of the pain started the day after [place] and was predominantly in the right lower quadrant.  It progressively worsened and he began having fevers and chills with nausea and vomiting on Friday of this week.  He did go to urgent care Friday and was started on Cipro Flagyl with a not help.  He then saw his nurse practitioner/PCP today who directed him to present to the emergency department.  Upon presentation in the ER he was afebrile, vitals were within normal limits.  Workup revealed a leukocytosis of 15.57 K, with an BRENDON and a creatinine of 2.3 (up from his baseline which was normal).  Labs were otherwise within normal limits.  A CT scan of the abdomen was done without contrast which revealed focally perforated diverticulitis on a background of acute on chronic inflammatory small bowel disease.   Fat within the wall of the distal small bowel suggests a history of infectious and/or inflammatory distal ileitis. 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 No drainable fluid collection.     He denies any episodes inbetween June and now.  He denies any family history of IBD.

## 2025-01-14 NOTE — CONSULTS
[facility] - Emergency Dept.  General Surgery  Consult Note    Patient Name: German Rabago  MRN: [id number]  Code Status: Full Code  Admission Date: 1/13/2025  Hospital Length of Stay: 0 days  Attending Physician: Nikos Edgar MD  Primary Care Provider: No primary care provider on file.    Patient information was obtained from patient, relative(s), past medical records, and ER records.     Inpatient consult to General surgery  Consult performed by: Aria Rivas MD  Consult ordered by: Gary Hampton DO  Reason for consult: diverticulitis      Inpatient consult to General surgery  Consult performed by: Aria Rivas MD  Consult ordered by: Robbin Rivero NP  Reason for consult: diverticulitis      Subjective:     Principal Problem: Diverticulitis    History of Present Illness:   [facility] 39 [facility] with no significant PMH who presented to the ED with an approximately 14 day history of worsening abdominal pain.  We were consulted for perforated diverticulitis.  Pt has a history of diverticulitis with perforation back in May/ [facility] and was [facility].  At that time on CT scan there was some concern for fistulization to the adjacent small bowel.   He was treated conservatively with abx.  He then had a colonoscopy in August of 2024 which was normal and did not show any evidence of IBD or fistulization with the small bowel.  He states this episode of the pain started the day after [place] and was predominantly in the right lower quadrant.  It progressively worsened and he began having fevers and chills with nausea and vomiting on Friday of this week.  He did go to urgent care Friday and was started on Cipro Flagyl with a not help.  He then saw his nurse practitioner/PCP today who directed him to present to the emergency department.  Upon presentation in the ER he was afebrile, vitals were within normal limits.  Workup revealed a  leukocytosis of 15.57 K, with an BRENDON and a creatinine of 2.3 (up from his baseline which was normal).  Labs were otherwise within normal limits.  A CT scan of the abdomen was done without contrast which revealed focally perforated diverticulitis on a background of acute on chronic inflammatory small bowel disease.   Fat within the wall of the distal small bowel suggests a history of infectious and/or inflammatory distal ileitis. 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 No drainable fluid collection.     He denies any episodes inbetween  and now.  He denies any family history of IBD.      No current facility-administered medications on file prior to encounter.     Current Outpatient Medications on File Prior to Encounter   Medication Sig    ciprofloxacin HCl (CIPRO) 500 MG tablet Take 500 mg by mouth every 12 (twelve) hours.    ibuprofen (ADVIL,MOTRIN) 800 MG tablet Take 1 tablet (800 mg total) by mouth 3 (three) times daily as needed for Pain.    metroNIDAZOLE (FLAGYL) 500 MG tablet Take 500 mg by mouth every 12 (twelve) hours.    [DISCONTINUED] orphenadrine (NORFLEX) 100 mg tablet Take 1 tablet (100 mg total) by mouth 2 (two) times daily as needed for Muscle spasms or Pain.       Review of patient's allergies indicates:  No Known Allergies    Past Medical History:   Diagnosis Date    Diverticular disease of large intestine without perforation or abscess      Past Surgical History:   Procedure Laterality Date    SMALL INTESTINE SURGERY  Can't remember    Long time ago at [facility] thought I had a hernia but it was a fatty deposit on small intestine     Family History       Problem Relation (Age of Onset)    Depression Mother          Tobacco Use    Smoking status: Some Days     Current packs/day: 0.00     Average packs/day: 0.5 packs/day for 15.0 years (7.5 ttl pk-yrs)     Types: Cigarettes     Last attempt to quit: 1/15/2019     Years since quittin.0    Smokeless tobacco: Never     Tobacco comments:     Since 17   Substance and Sexual Activity    Alcohol use: Never    Drug use: Not Currently     Frequency: 7.0 times per week     Types: Marijuana     Comment: For mental health use    Sexual activity: Not Currently     Partners: Female     Review of Systems   Constitutional:  Positive for activity change, appetite change, chills, fatigue and fever.   Respiratory:  Negative for cough and shortness of breath.    Cardiovascular:  Negative for chest pain.   Gastrointestinal:  Positive for abdominal pain, nausea and vomiting.   Genitourinary:  Negative for flank pain.     Objective:     Vital Signs (Most Recent):  Temp: 98.6 °F (37 °C) (01/13/25 1220)  Pulse: 62 (01/13/25 1800)  Resp: 17 (01/13/25 1800)  BP: 106/65 (01/13/25 1800)  SpO2: 99 % (01/13/25 1800) Vital Signs (24h Range):  Temp:  [97.1 °F (36.2 °C)-98.6 °F (37 °C)] 98.6 °F (37 °C)  Pulse:  [] 62  Resp:  [16-19] 17  SpO2:  [98 %-100 %] 99 %  BP: (106-140)/(58-94) 106/65     Weight: 80 kg (176 lb 5.9 oz)  Body mass index is 24.6 kg/m².     Physical Exam  Constitutional:       General: He is not in acute distress.     Appearance: Normal appearance.   HENT:      Head: Normocephalic and atraumatic.   Eyes:      Extraocular Movements: Extraocular movements intact.      Conjunctiva/sclera: Conjunctivae normal.   Cardiovascular:      Rate and Rhythm: Normal rate and regular rhythm.   Pulmonary:      Effort: Pulmonary effort is normal.   Abdominal:      General: Abdomen is flat. There is no distension.      Palpations: Abdomen is soft. There is no mass.      Tenderness: There is no guarding or rebound.      Hernia: No hernia is present.      Comments: Localized peritonitis in RLQ   Musculoskeletal:         General: No swelling, tenderness, deformity or signs of injury. Normal range of motion.   Skin:     General: Skin is warm and dry.      Coloration: Skin is not jaundiced.   Neurological:      General: No focal deficit present.       Mental Status: He is alert and oriented to person, place, and time.   Psychiatric:         Mood and Affect: Mood normal.         Behavior: Behavior normal.         Thought Content: Thought content normal.            I have reviewed all pertinent lab results within the past 24 hours.  CBC:   Recent Labs   Lab 01/13/25  1348   WBC 15.67*   RBC 5.47   HGB 16.3   HCT 48.5   *   MCV 89   MCH 29.8   MCHC 33.6     BMP:   Recent Labs   Lab 01/13/25  1348   GLU 96      K 3.8      CO2 23   BUN 27*   CREATININE 2.3*   CALCIUM 10.2     CMP:   Recent Labs   Lab 01/13/25  1348   GLU 96   CALCIUM 10.2   ALBUMIN 4.2   PROT 8.6*      K 3.8   CO2 23      BUN 27*   CREATININE 2.3*   ALKPHOS 84   ALT 9*   AST 14   BILITOT 0.7       Significant Diagnostics:  I have reviewed all pertinent imaging results/findings within the past 24 hours.  CT: I have reviewed all pertinent results/findings within the past 24 hours and my personal findings are:  focal free air, no distant free air.  Some fluid but no ascess or drainable collections.    Imaging Results              CT Abdomen Pelvis  Without Contrast (Final result)  Result time 01/13/25 15:44:40      Final result by Raymon Law MD (01/13/25 15:44:40)                   Impression:      Findings probably representing focally perforated diverticulitis on a background of acute on chronic inflammatory small bowel disease.  Alternatively the inflamed small bowel may be totally secondary to the perforated colonic diverticulum.  Fat within the wall of the distal small bowel suggests a history of infectious and/or inflammatory distal ileitis.  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  No drainable fluid collection.  Colonoscopy is recommended when clinically appropriate to rule out underlying colonic mass.  Consider surgical consultation to aid in management.    Findings communicated to MARSHALL Rivero by epic chat on January 13, 2025 at  15:43.      Electronically signed by: Raymon Mauricio  Date:    01/13/2025  Time:    15:44               Narrative:    EXAMINATION:  CT ABDOMEN PELVIS WITHOUT CONTRAST    CLINICAL HISTORY:  LLQ abdominal pain.  History diverticulitis, BRENDON, unable to use IV contrast;    COMPARISON:  None available.    TECHNIQUE:  Axial CT images were obtained of the abdomen and pelvis.  Iterative reconstruction technique was used. The CT exam was performed using one or more of the following dose reduction techniques- Automated exposure control, adjustment of the mA and/or kV according to patient size, and/or use of iterative reconstructed technique.    FINDINGS:  Heart: Normal in size. No pericardial effusion.    Lung Bases: Mild bibasilar atelectasis/scarring.    Liver: Unremarkable.    Gallbladder: No calcified gallstones.    Bile Ducts: No evidence of dilated ducts.    Pancreas: No mass or peripancreatic fat stranding.    Spleen: Unremarkable.    Adrenals: Unremarkable.    Kidneys/ Ureters: Unremarkable.    Bladder: No evidence of wall thickening.    Reproductive organs: Unremarkable.    GI Tract/Mesentery: Multiple colonic diverticula.  In the pelvis between the sigmoid colon and multiple inflamed loops of small bowel there are numerous locules of extraluminal gas.  Mild associated free fluid.  No drainable fluid collection.  No evidence of bowel obstruction.  There is fatty infiltration throughout the wall of the distal ileum.  The appendix has a normal appearance.    Peritoneal Space: As above.    Retroperitoneum: No adenopathy.    Abdominal wall: Unremarkable.    Vasculature: Mild atherosclerotic disease. No aortic aneurysm.    Bones: No acute fracture.                                        Assessment/Plan:     * Diverticulitis  History of diverticulitis in June, colonoscopy in August was normal.  Has had sxs for almost 2 weeks now.  Focal perforated diverticulitis without distant free air or diffuse peritonitis.       -- no  acute surgical intervention   -- admit to medicine  -- NPO, ok for ice chips and sips of water  -- Abx- Zosyn  -- will be seen by colorectal surgery tomorrow     BRENDON (acute kidney injury)  -- Management as per primary team       VTE Risk Mitigation (From admission, onward)           Ordered     IP VTE LOW RISK PATIENT  Once         01/13/25 1935     Place sequential compression device  Until discontinued         01/13/25 1935                    Thank you for your consult. I will follow-up with patient. Please contact us if you have any additional questions.    Aria Rivas MD  General Surgery  [facility] - Emergency Dept.

## 2025-01-14 NOTE — ASSESSMENT & PLAN NOTE
"CT - "focally perforated diverticulitis on a background of acute on chronic inflammatory small bowel disease."  IV antibiotics  IV fluids  Multimodal pain control  Keep NPO  Surgery following  "

## 2025-01-14 NOTE — ASSESSMENT & PLAN NOTE
This patient does have evidence of infective focus  My overall impression is sepsis.  Source: Abdominal  Antibiotics given-   Antibiotics (72h ago, onward)    Start     Stop Route Frequency Ordered    01/13/25 2200  metronidazole IVPB 500 mg  (Intra-abdominal Infection)         01/19/25 2159 IV Every 8 hours 01/13/25 1959 01/13/25 2100  cefTRIAXone injection 2 g  (Intra-abdominal Infection)         01/19/25 2059 IV Every 24 hours (non-standard times) 01/13/25 1959        Latest lactate reviewed-  Recent Labs   Lab 01/14/25  0510   LACTATE 0.6       Organ dysfunction indicated by Acute kidney injury    Fluid challenge Not needed - patient is not hypotensive      Post- resuscitation assessment No - Post resuscitation assessment not needed       Will Not start Pressors- Levophed for MAP of 65  Source control achieved by: IV antibiotics

## 2025-01-14 NOTE — ASSESSMENT & PLAN NOTE
"CT - "focally perforated diverticulitis on a background of acute on chronic inflammatory small bowel disease."  IV antibiotics  IV fluids  Multimodal pain control  Keep NPO  Surgery following, recommendations:   --no acute surgical intervention,   --NPO, ok for ice chips and sips of water,  --Abx- Zosyn,--will be seen by colorectal surgery tomorrowconservative management.    "

## 2025-01-14 NOTE — ASSESSMENT & PLAN NOTE
38yo M with sigmoid diverticulitis vs small bowel inflammation concerning for IBD    - No acute surgical intervention required at this time.  Would ideally like to cool this area down with IV antibiotics and bowel rest and avoid surgical intervention during this hospitalization.  Discussed with the patient that if he requires surgery during this hospitalization and increases his risk for ostomy formation.  He will likely require surgical resection eventually but we would hopefully perform this as an outpatient and he is amenable to this plan.  - continue IV antibiotics time for 1 more day.  If continues to have no leukocytosis and improving pain, can convert to p.o. antibiotics tomorrow.  - NPO. Okay for clear liquids for dinner tonight and monitor for tolerance  - Rest of care per primary team

## 2025-01-14 NOTE — ASSESSMENT & PLAN NOTE
History of diverticulitis in June, colonoscopy in August was normal.  Has had sxs for almost 2 weeks now.  Focal perforated diverticulitis without distant free air or diffuse peritonitis.       -- no acute surgical intervention   -- admit to medicine  -- NPO, ok for ice chips and sips of water  -- Abx- Zosyn  -- will be seen by colorectal surgery tomorrow

## 2025-01-14 NOTE — SUBJECTIVE & OBJECTIVE
No current facility-administered medications on file prior to encounter.     Current Outpatient Medications on File Prior to Encounter   Medication Sig    ciprofloxacin HCl (CIPRO) 500 MG tablet Take 500 mg by mouth every 12 (twelve) hours.    ibuprofen (ADVIL,MOTRIN) 800 MG tablet Take 1 tablet (800 mg total) by mouth 3 (three) times daily as needed for Pain.    metroNIDAZOLE (FLAGYL) 500 MG tablet Take 500 mg by mouth every 12 (twelve) hours.    [DISCONTINUED] orphenadrine (NORFLEX) 100 mg tablet Take 1 tablet (100 mg total) by mouth 2 (two) times daily as needed for Muscle spasms or Pain.       Review of patient's allergies indicates:  No Known Allergies    Past Medical History:   Diagnosis Date    Diverticular disease of large intestine without perforation or abscess      Past Surgical History:   Procedure Laterality Date    SMALL INTESTINE SURGERY  Can't remember    Long time ago at [facility] thought I had a hernia but it was a fatty deposit on small intestine     Family History       Problem Relation (Age of Onset)    Depression Mother          Tobacco Use    Smoking status: Some Days     Current packs/day: 0.00     Average packs/day: 0.5 packs/day for 15.0 years (7.5 ttl pk-yrs)     Types: Cigarettes     Last attempt to quit: 1/15/2019     Years since quittin.0    Smokeless tobacco: Never    Tobacco comments:     Since 17   Substance and Sexual Activity    Alcohol use: Never    Drug use: Not Currently     Frequency: 7.0 times per week     Types: Marijuana     Comment: For mental health use    Sexual activity: Not Currently     Partners: Female     Review of Systems   Constitutional:  Positive for activity change, appetite change, chills, fatigue and fever.   Respiratory:  Negative for cough and shortness of breath.    Cardiovascular:  Negative for chest pain.   Gastrointestinal:  Positive for abdominal pain, nausea and vomiting.   Genitourinary:  Negative for flank pain.     Objective:     Vital Signs  (Most Recent):  Temp: 98.6 °F (37 °C) (01/13/25 1220)  Pulse: 62 (01/13/25 1800)  Resp: 17 (01/13/25 1800)  BP: 106/65 (01/13/25 1800)  SpO2: 99 % (01/13/25 1800) Vital Signs (24h Range):  Temp:  [97.1 °F (36.2 °C)-98.6 °F (37 °C)] 98.6 °F (37 °C)  Pulse:  [] 62  Resp:  [16-19] 17  SpO2:  [98 %-100 %] 99 %  BP: (106-140)/(58-94) 106/65     Weight: 80 kg (176 lb 5.9 oz)  Body mass index is 24.6 kg/m².     Physical Exam  Constitutional:       General: He is not in acute distress.     Appearance: Normal appearance.   HENT:      Head: Normocephalic and atraumatic.   Eyes:      Extraocular Movements: Extraocular movements intact.      Conjunctiva/sclera: Conjunctivae normal.   Cardiovascular:      Rate and Rhythm: Normal rate and regular rhythm.   Pulmonary:      Effort: Pulmonary effort is normal.   Abdominal:      General: Abdomen is flat. There is no distension.      Palpations: Abdomen is soft. There is no mass.      Tenderness: There is no guarding or rebound.      Hernia: No hernia is present.      Comments: Localized peritonitis in RLQ   Musculoskeletal:         General: No swelling, tenderness, deformity or signs of injury. Normal range of motion.   Skin:     General: Skin is warm and dry.      Coloration: Skin is not jaundiced.   Neurological:      General: No focal deficit present.      Mental Status: He is alert and oriented to person, place, and time.   Psychiatric:         Mood and Affect: Mood normal.         Behavior: Behavior normal.         Thought Content: Thought content normal.            I have reviewed all pertinent lab results within the past 24 hours.  CBC:   Recent Labs   Lab 01/13/25  1348   WBC 15.67*   RBC 5.47   HGB 16.3   HCT 48.5   *   MCV 89   MCH 29.8   MCHC 33.6     BMP:   Recent Labs   Lab 01/13/25  1348   GLU 96      K 3.8      CO2 23   BUN 27*   CREATININE 2.3*   CALCIUM 10.2     CMP:   Recent Labs   Lab 01/13/25  1348   GLU 96   CALCIUM 10.2   ALBUMIN 4.2    PROT 8.6*      K 3.8   CO2 23      BUN 27*   CREATININE 2.3*   ALKPHOS 84   ALT 9*   AST 14   BILITOT 0.7       Significant Diagnostics:  I have reviewed all pertinent imaging results/findings within the past 24 hours.  CT: I have reviewed all pertinent results/findings within the past 24 hours and my personal findings are:  focal free air, no distant free air.  Some fluid but no ascess or drainable collections.    Imaging Results              CT Abdomen Pelvis  Without Contrast (Final result)  Result time 01/13/25 15:44:40      Final result by Raymon Law MD (01/13/25 15:44:40)                   Impression:      Findings probably representing focally perforated diverticulitis on a background of acute on chronic inflammatory small bowel disease.  Alternatively the inflamed small bowel may be totally secondary to the perforated colonic diverticulum.  Fat within the wall of the distal small bowel suggests a history of infectious and/or inflammatory distal ileitis.  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  No drainable fluid collection.  Colonoscopy is recommended when clinically appropriate to rule out underlying colonic mass.  Consider surgical consultation to aid in management.    Findings communicated to MARSHALL Rivero by epic chat on January 13, 2025 at 15:43.      Electronically signed by: Raymon Law  Date:    01/13/2025  Time:    15:44               Narrative:    EXAMINATION:  CT ABDOMEN PELVIS WITHOUT CONTRAST    CLINICAL HISTORY:  LLQ abdominal pain.  History diverticulitis, BRENDON, unable to use IV contrast;    COMPARISON:  None available.    TECHNIQUE:  Axial CT images were obtained of the abdomen and pelvis.  Iterative reconstruction technique was used. The CT exam was performed using one or more of the following dose reduction techniques- Automated exposure control, adjustment of the mA and/or kV according to patient size, and/or use of iterative reconstructed  technique.    FINDINGS:  Heart: Normal in size. No pericardial effusion.    Lung Bases: Mild bibasilar atelectasis/scarring.    Liver: Unremarkable.    Gallbladder: No calcified gallstones.    Bile Ducts: No evidence of dilated ducts.    Pancreas: No mass or peripancreatic fat stranding.    Spleen: Unremarkable.    Adrenals: Unremarkable.    Kidneys/ Ureters: Unremarkable.    Bladder: No evidence of wall thickening.    Reproductive organs: Unremarkable.    GI Tract/Mesentery: Multiple colonic diverticula.  In the pelvis between the sigmoid colon and multiple inflamed loops of small bowel there are numerous locules of extraluminal gas.  Mild associated free fluid.  No drainable fluid collection.  No evidence of bowel obstruction.  There is fatty infiltration throughout the wall of the distal ileum.  The appendix has a normal appearance.    Peritoneal Space: As above.    Retroperitoneum: No adenopathy.    Abdominal wall: Unremarkable.    Vasculature: Mild atherosclerotic disease. No aortic aneurysm.    Bones: No acute fracture.

## 2025-01-14 NOTE — PROGRESS NOTES
[facility] - Emergency Dept.  [facility] Medicine  Progress Note    Patient Name: German Rabago  MRN: [id number]  Patient Class: IP- Inpatient   Admission Date: 1/13/2025  Length of Stay: 1 days  Attending Physician: Scott Tejeda MD  Primary Care Provider: No primary care provider on file.        Subjective     Principal Problem:Diverticulitis        HPI:  40 y/o male with PMHx of anxiety disorder, depression, tobacco use disorder, marijuana use disorder presents to the ER with complaints of RLQ abdominal pain, nausea, vomiting, decreased PO intake for the past 4 days. He was seen a UC, prescribed cipro, flagyl, and lactulose for diverticular disease and constipation. He reports worsening of symptoms, unable to tolerate PO intake, decreased UOP, fevers/chills. In ER, lab signficiant for leukocyotosis (WBC 16k), BRENDON (BUN/Cr 27/2.3); CT A/P with diverticulitis, possible focal perforation. Surgery consulted, recommended conservative management. Admit as inpatient.    Overview/Hospital Course:  40 y/o male, comorbid conditions include anxiety d/o, depression, tobacco use d/o, THC use, to ED with RLQ pain, N/V, and decreased PO tolerance over past 4 days. Seen previously at  and treated with abx and lactulose for constipation. When he was unable to tolerate PO intake and had decr UOP, fever/chills he sought ER care. CT - positive for diverticulitis, possible focal perf. Surgery consulted and recommended: no acute surgical intervention, NPO, ok for ice chips and sips of water, Abx- Zosyn, will be seen by colorectal surgery tomorrowconservative management.      Interval History: Held in ER bed. Denies N/V. Tenderness to RLQ with palpation. No acute events overnight.    Review of Systems   Constitutional:  Negative for chills and fever.   HENT:  Negative for congestion and rhinorrhea.    Eyes:  Negative for discharge and visual disturbance.   Respiratory:  Negative for cough and shortness of breath.    Cardiovascular:   Negative for chest pain, palpitations and leg swelling.   Gastrointestinal:  Positive for abdominal pain (RLQ with palpation). Negative for constipation, diarrhea, nausea and vomiting.   Genitourinary:  Negative for difficulty urinating and dysuria.   Musculoskeletal:  Negative for arthralgias, back pain and myalgias.   Skin:  Negative for color change.   Neurological:  Negative for dizziness and headaches.   Psychiatric/Behavioral:  Negative for sleep disturbance.      Objective:     Vital Signs (Most Recent):  Temp: 98.6 °F (37 °C) (01/13/25 1220)  Pulse: 67 (01/14/25 0800)  Resp: 18 (01/14/25 0800)  BP: 118/66 (01/14/25 0800)  SpO2: 99 % (01/14/25 0800) Vital Signs (24h Range):  Temp:  [98.6 °F (37 °C)] 98.6 °F (37 °C)  Pulse:  [55-91] 67  Resp:  [13-19] 18  SpO2:  [97 %-100 %] 99 %  BP: (106-146)/(60-94) 118/66     Weight: 80 kg (176 lb 5.9 oz)  Body mass index is 24.6 kg/m².    Intake/Output Summary (Last 24 hours) at 1/14/2025 1155  Last data filed at 1/14/2025 0745  Gross per 24 hour   Intake 1300 ml   Output --   Net 1300 ml         Physical Exam  Vitals and nursing note reviewed.   Constitutional:       General: He is not in acute distress.     Appearance: He is not ill-appearing, toxic-appearing or diaphoretic.   HENT:      Head: Normocephalic and atraumatic.      Mouth/Throat:      Mouth: Mucous membranes are moist.   Eyes:      General: No scleral icterus.        Right eye: No discharge.         Left eye: No discharge.      Pupils: Pupils are equal, round, and reactive to light.   Cardiovascular:      Rate and Rhythm: Normal rate and regular rhythm.      Heart sounds: Normal heart sounds.   Pulmonary:      Effort: Pulmonary effort is normal. No respiratory distress.   Abdominal:      General: Bowel sounds are normal.      Tenderness: There is abdominal tenderness.   Musculoskeletal:      Cervical back: No rigidity.      Right lower leg: No edema.      Left lower leg: No edema.   Skin:     General: Skin  "is warm and dry.      Coloration: Skin is not jaundiced.   Neurological:      Mental Status: He is alert and oriented to person, place, and time. Mental status is at baseline.   Psychiatric:         Mood and Affect: Mood normal.         Behavior: Behavior normal.             Significant Labs: All pertinent labs within the past 24 hours have been reviewed.  Blood Culture:   Recent Labs   Lab 01/13/25  1635 01/13/25  2044 01/13/25 2054   LABBLOO No Growth to date  No Growth to date No Growth to date No Growth to date     BMP:   Recent Labs   Lab 01/14/25  0510   GLU 87      K 3.7      CO2 18*   BUN 25*   CREATININE 1.6*   CALCIUM 8.8   MG 2.2     CBC:   Recent Labs   Lab 01/13/25  1348 01/14/25  0510   WBC 15.67* 10.84   HGB 16.3 13.9*   HCT 48.5 41.3   * 382     Lactic Acid:   Recent Labs   Lab 01/13/25  1637 01/14/25  0510   LACTATE 2.2 0.6     Urine Studies:   Recent Labs   Lab 01/13/25  1410   COLORU Yellow   APPEARANCEUA Clear   PHUR 6.0   SPECGRAV 1.015   PROTEINUA 1+*   GLUCUA Negative   KETONESU 2+*   BILIRUBINUA Negative   OCCULTUA 1+*   NITRITE Negative   UROBILINOGEN Negative   LEUKOCYTESUR Trace*   RBCUA 4   WBCUA 9*   BACTERIA Rare   HYALINECASTS 3*       Significant Imaging: I have reviewed all pertinent imaging results/findings within the past 24 hours.    Assessment and Plan     * Diverticulitis  CT - "focally perforated diverticulitis on a background of acute on chronic inflammatory small bowel disease."  IV antibiotics  IV fluids  Multimodal pain control  Keep NPO  Surgery following, recommendations:   --no acute surgical intervention,   --NPO, ok for ice chips and sips of water,  --Abx- Zosyn,--will be seen by colorectal surgery tomorrowconservative management.      Sepsis  This patient does have evidence of infective focus  My overall impression is sepsis.  Source: Abdominal  Antibiotics given-   Antibiotics (72h ago, onward)      Start     Stop Route Frequency Ordered    " 01/13/25 2200  metronidazole IVPB 500 mg  (Intra-abdominal Infection)         01/19/25 2159 IV Every 8 hours 01/13/25 1959 01/13/25 2100  cefTRIAXone injection 2 g  (Intra-abdominal Infection)         01/19/25 2059 IV Every 24 hours (non-standard times) 01/13/25 1959          Latest lactate reviewed-  Recent Labs   Lab 01/14/25  0510   LACTATE 0.6       Organ dysfunction indicated by Acute kidney injury    Fluid challenge Not needed - patient is not hypotensive      Post- resuscitation assessment No - Post resuscitation assessment not needed       Will Not start Pressors- Levophed for MAP of 65  Source control achieved by: IV antibiotics    BRENDON (acute kidney injury)  BRENDON is likely due to pre-renal azotemia due to intravascular volume depletion. Baseline creatinine is  0.8 . Most recent creatinine and eGFR are listed below.  Recent Labs     01/13/25  1348 01/14/25  0510   CREATININE 2.3* 1.6*   EGFRNORACEVR 36* 56*        Plan  - BRENDON is improving. Will adjust treatment as follows: IV fluids  - Avoid nephrotoxins and renally dose meds for GFR listed above  - Monitor urine output, serial BMP, and adjust therapy as needed      VTE Risk Mitigation (From admission, onward)           Ordered     IP VTE LOW RISK PATIENT  Once         01/13/25 1935     Place sequential compression device  Until discontinued         01/13/25 1935                    Discharge Planning   FARRUKH:      Code Status: Full Code   Medical Readiness for Discharge Date:                            ARIELLE DeeC  Department of Hospital Medicine   [facility] - Emergency Dept.

## 2025-01-15 LAB
ALBUMIN SERPL BCP-MCNC: 3 G/DL (ref 3.5–5.2)
ALP SERPL-CCNC: 54 U/L (ref 40–150)
ALT SERPL W/O P-5'-P-CCNC: 9 U/L (ref 10–44)
ANION GAP SERPL CALC-SCNC: 11 MMOL/L (ref 8–16)
AST SERPL-CCNC: 8 U/L (ref 10–40)
BASOPHILS # BLD AUTO: 0.05 K/UL (ref 0–0.2)
BASOPHILS NFR BLD: 0.6 % (ref 0–1.9)
BILIRUB SERPL-MCNC: 0.5 MG/DL (ref 0.1–1)
BUN SERPL-MCNC: 21 MG/DL (ref 6–20)
CALCIUM SERPL-MCNC: 8.6 MG/DL (ref 8.7–10.5)
CHLORIDE SERPL-SCNC: 110 MMOL/L (ref 95–110)
CO2 SERPL-SCNC: 19 MMOL/L (ref 23–29)
CREAT SERPL-MCNC: 1.1 MG/DL (ref 0.5–1.4)
DIFFERENTIAL METHOD BLD: ABNORMAL
EOSINOPHIL # BLD AUTO: 0.2 K/UL (ref 0–0.5)
EOSINOPHIL NFR BLD: 1.7 % (ref 0–8)
ERYTHROCYTE [DISTWIDTH] IN BLOOD BY AUTOMATED COUNT: 13.3 % (ref 11.5–14.5)
EST. GFR  (NO RACE VARIABLE): >60 ML/MIN/1.73 M^2
GLUCOSE SERPL-MCNC: 82 MG/DL (ref 70–110)
HCT VFR BLD AUTO: 40.1 % (ref 40–54)
HGB BLD-MCNC: 13.3 G/DL (ref 14–18)
IMM GRANULOCYTES # BLD AUTO: 0.07 K/UL (ref 0–0.04)
IMM GRANULOCYTES NFR BLD AUTO: 0.8 % (ref 0–0.5)
LYMPHOCYTES # BLD AUTO: 2.5 K/UL (ref 1–4.8)
LYMPHOCYTES NFR BLD: 27.2 % (ref 18–48)
MAGNESIUM SERPL-MCNC: 1.9 MG/DL (ref 1.6–2.6)
MCH RBC QN AUTO: 30.7 PG (ref 27–31)
MCHC RBC AUTO-ENTMCNC: 33.2 G/DL (ref 32–36)
MCV RBC AUTO: 93 FL (ref 82–98)
MONOCYTES # BLD AUTO: 0.7 K/UL (ref 0.3–1)
MONOCYTES NFR BLD: 7.4 % (ref 4–15)
NEUTROPHILS # BLD AUTO: 5.6 K/UL (ref 1.8–7.7)
NEUTROPHILS NFR BLD: 62.3 % (ref 38–73)
NRBC BLD-RTO: 0 /100 WBC
PHOSPHATE SERPL-MCNC: 3.4 MG/DL (ref 2.7–4.5)
PLATELET # BLD AUTO: 344 K/UL (ref 150–450)
PMV BLD AUTO: 9.6 FL (ref 9.2–12.9)
POTASSIUM SERPL-SCNC: 4.7 MMOL/L (ref 3.5–5.1)
PROT SERPL-MCNC: 6 G/DL (ref 6–8.4)
RBC # BLD AUTO: 4.33 M/UL (ref 4.6–6.2)
SODIUM SERPL-SCNC: 140 MMOL/L (ref 136–145)
WBC # BLD AUTO: 9 K/UL (ref 3.9–12.7)

## 2025-01-15 PROCEDURE — 25000003 PHARM REV CODE 250: Performed by: STUDENT IN AN ORGANIZED HEALTH CARE EDUCATION/TRAINING PROGRAM

## 2025-01-15 PROCEDURE — 99232 SBSQ HOSP IP/OBS MODERATE 35: CPT | Mod: ,,,

## 2025-01-15 PROCEDURE — 63600175 PHARM REV CODE 636 W HCPCS: Performed by: STUDENT IN AN ORGANIZED HEALTH CARE EDUCATION/TRAINING PROGRAM

## 2025-01-15 PROCEDURE — 36415 COLL VENOUS BLD VENIPUNCTURE: CPT | Performed by: HOSPITALIST

## 2025-01-15 PROCEDURE — 83735 ASSAY OF MAGNESIUM: CPT | Performed by: HOSPITALIST

## 2025-01-15 PROCEDURE — 36415 COLL VENOUS BLD VENIPUNCTURE: CPT

## 2025-01-15 PROCEDURE — 63600175 PHARM REV CODE 636 W HCPCS

## 2025-01-15 PROCEDURE — 21400001 HC TELEMETRY ROOM

## 2025-01-15 PROCEDURE — 25000003 PHARM REV CODE 250

## 2025-01-15 PROCEDURE — 25000003 PHARM REV CODE 250: Performed by: HOSPITALIST

## 2025-01-15 PROCEDURE — 80053 COMPREHEN METABOLIC PANEL: CPT

## 2025-01-15 PROCEDURE — 84100 ASSAY OF PHOSPHORUS: CPT | Performed by: HOSPITALIST

## 2025-01-15 PROCEDURE — 85025 COMPLETE CBC W/AUTO DIFF WBC: CPT

## 2025-01-15 RX ORDER — MAGNESIUM SULFATE HEPTAHYDRATE 40 MG/ML
2 INJECTION, SOLUTION INTRAVENOUS ONCE
Status: COMPLETED | OUTPATIENT
Start: 2025-01-15 | End: 2025-01-15

## 2025-01-15 RX ORDER — AMOXICILLIN AND CLAVULANATE POTASSIUM 500; 125 MG/1; MG/1
1 TABLET, FILM COATED ORAL 3 TIMES DAILY
Status: DISCONTINUED | OUTPATIENT
Start: 2025-01-15 | End: 2025-01-15

## 2025-01-15 RX ORDER — AMOXICILLIN AND CLAVULANATE POTASSIUM 875; 125 MG/1; MG/1
1 TABLET, FILM COATED ORAL EVERY 12 HOURS
Status: DISCONTINUED | OUTPATIENT
Start: 2025-01-15 | End: 2025-01-16 | Stop reason: HOSPADM

## 2025-01-15 RX ADMIN — MORPHINE SULFATE 4 MG: 4 INJECTION INTRAVENOUS at 08:01

## 2025-01-15 RX ADMIN — MAGNESIUM SULFATE HEPTAHYDRATE 2 G: 40 INJECTION, SOLUTION INTRAVENOUS at 09:01

## 2025-01-15 RX ADMIN — TRAZODONE HYDROCHLORIDE 50 MG: 50 TABLET ORAL at 08:01

## 2025-01-15 RX ADMIN — SODIUM CHLORIDE: 9 INJECTION, SOLUTION INTRAVENOUS at 04:01

## 2025-01-15 RX ADMIN — PIPERACILLIN SODIUM AND TAZOBACTAM SODIUM 4.5 G: 4; .5 INJECTION, POWDER, LYOPHILIZED, FOR SOLUTION INTRAVENOUS at 02:01

## 2025-01-15 RX ADMIN — MORPHINE SULFATE 2 MG: 4 INJECTION INTRAVENOUS at 05:01

## 2025-01-15 RX ADMIN — MORPHINE SULFATE 4 MG: 4 INJECTION INTRAVENOUS at 09:01

## 2025-01-15 RX ADMIN — AMOXICILLIN AND CLAVULANATE POTASSIUM 1 TABLET: 875; 125 TABLET, FILM COATED ORAL at 01:01

## 2025-01-15 RX ADMIN — SODIUM CHLORIDE: 9 INJECTION, SOLUTION INTRAVENOUS at 02:01

## 2025-01-15 RX ADMIN — MORPHINE SULFATE 2 MG: 4 INJECTION INTRAVENOUS at 04:01

## 2025-01-15 RX ADMIN — AMOXICILLIN AND CLAVULANATE POTASSIUM 1 TABLET: 875; 125 TABLET, FILM COATED ORAL at 08:01

## 2025-01-15 NOTE — SUBJECTIVE & OBJECTIVE
Interval History: pain improved but still present to RLQ. Tolerating CLD without n/v. Afebrile. WBC downtrended. Passing flatus.    Medications:  Continuous Infusions:   0.9% NaCl   Intravenous Continuous 75 mL/hr at 01/15/25 0647 Rate Verify at 01/15/25 0647     Scheduled Meds:   magnesium sulfate IVPB  2 g Intravenous Once    piperacillin-tazobactam (Zosyn) IV (PEDS and ADULTS) (extended infusion is not appropriate)  4.5 g Intravenous Q8H     PRN Meds:  Current Facility-Administered Medications:     acetaminophen, 650 mg, Oral, Q8H PRN    dextrose 50%, 12.5 g, Intravenous, PRN    dextrose 50%, 25 g, Intravenous, PRN    glucagon (human recombinant), 1 mg, Intramuscular, PRN    glucose, 16 g, Oral, PRN    glucose, 24 g, Oral, PRN    melatonin, 6 mg, Oral, Nightly PRN    morphine, 2 mg, Intravenous, Q3H PRN    morphine, 4 mg, Intravenous, Q3H PRN    naloxone, 0.02 mg, Intravenous, PRN    ondansetron, 4 mg, Intravenous, Q6H PRN    sodium chloride 0.9%, 10 mL, Intravenous, Q12H PRN    traZODone, 50 mg, Oral, Nightly PRN     Review of patient's allergies indicates:  No Known Allergies  Objective:     Vital Signs (Most Recent):  Temp: 97.4 °F (36.3 °C) (01/15/25 0808)  Pulse: (!) 56 (01/15/25 0808)  Resp: 18 (01/15/25 0947)  BP: 119/65 (01/15/25 0808)  SpO2: 97 % (01/15/25 0808) Vital Signs (24h Range):  Temp:  [97.4 °F (36.3 °C)-98.3 °F (36.8 °C)] 97.4 °F (36.3 °C)  Pulse:  [50-74] 56  Resp:  [16-20] 18  SpO2:  [97 %-98 %] 97 %  BP: (100-122)/(64-76) 119/65     Weight: 77.1 kg (169 lb 15.6 oz)  Body mass index is 23.71 kg/m².    Intake/Output - Last 3 Shifts         01/13 0700 01/14 0659 01/14 0700  01/15 0659 01/15 0700 01/16 0659    I.V. (mL/kg) 1000 (12.5) 2607.4 (33.8)     IV Piggyback 200 296.3     Total Intake(mL/kg) 1200 (15) 2903.7 (37.7)     Urine (mL/kg/hr)  700 (0.4) 300 (0.9)    Total Output  700 300    Net +1200 +2203.7 -300                    Physical Exam  Constitutional:       General: He is not in  acute distress.     Appearance: Normal appearance.   HENT:      Head: Normocephalic and atraumatic.   Eyes:      Extraocular Movements: Extraocular movements intact.      Conjunctiva/sclera: Conjunctivae normal.   Cardiovascular:      Rate and Rhythm: Bradycardia present.   Pulmonary:      Effort: Pulmonary effort is normal.   Abdominal:      Comments: Soft, nondistended, +TTP RLQ; no rebound or guarding; no previous incisions   Musculoskeletal:         General: No swelling, tenderness, deformity or signs of injury. Normal range of motion.   Skin:     General: Skin is warm and dry.      Coloration: Skin is not jaundiced.   Neurological:      General: No focal deficit present.      Mental Status: He is alert and oriented to person, place, and time.   Psychiatric:         Mood and Affect: Mood normal.         Behavior: Behavior normal.          Significant Labs:  I have reviewed all pertinent lab results within the past 24 hours.  CBC:   Recent Labs   Lab 01/15/25  0819   WBC 9.00   RBC 4.33*   HGB 13.3*   HCT 40.1      MCV 93   MCH 30.7   MCHC 33.2     BMP:   Recent Labs   Lab 01/15/25  0540 01/15/25  0819   GLU  --  82   NA  --  140   K  --  4.7   CL  --  110   CO2  --  19*   BUN  --  21*   CREATININE  --  1.1   CALCIUM  --  8.6*   MG 1.9  --        Significant Diagnostics:  I have reviewed all pertinent imaging results/findings within the past 24 hours.

## 2025-01-15 NOTE — CONSULTS
Inpatient Nutrition Assessment    Admit Date: 1/13/2025   Total duration of encounter: 2 days   Patient Age: 39 y.o.    Nutrition Recommendation/Prescription     Continue current diet (Diet Clear Liquid) as tolerated. Advance to Low-fiber diet per MD.   Add Boost Breeze (provides 250 kcal, 9 g protein per serving) with all meals  Monitor PO intakes, labs, and wt  Recommend to obtain wts twice weekly.     Communication of Recommendations: reviewed with nurse    Nutrition Assessment     Malnutrition Assessment/Nutrition-Focused Physical Exam  Unable to assess                                                              A minimum of two characteristics is recommended for diagnosis of either severe or non-severe malnutrition.    Chart Review    Reason Seen: malnutrition screening tool (MST) and physician consult for diverticulitis    Malnutrition Screening Tool Results   Have you recently lost weight without trying?: Yes: 14-23 lbs  Have you been eating poorly because of a decreased appetite?: Yes   MST Score: 3   Diagnosis:  Diverticulitis  Sepsis  BRENDON    Relevant Medical History:   anxiety disorder, depression, tobacco use disorder, marijuana use     Scheduled Medications:  amoxicillin-clavulanate 875-125mg, 1 tablet, Q12H    Continuous Infusions:  0.9% NaCl, Last Rate: 75 mL/hr at 01/15/25 0647    PRN Medications:  acetaminophen, 650 mg, Q8H PRN  dextrose 50%, 12.5 g, PRN  dextrose 50%, 25 g, PRN  glucagon (human recombinant), 1 mg, PRN  glucose, 16 g, PRN  glucose, 24 g, PRN  melatonin, 6 mg, Nightly PRN  morphine, 2 mg, Q3H PRN  morphine, 4 mg, Q3H PRN  naloxone, 0.02 mg, PRN  ondansetron, 4 mg, Q6H PRN  sodium chloride 0.9%, 10 mL, Q12H PRN  traZODone, 50 mg, Nightly PRN    Calorie Containing IV Medications: no significant kcals from medications at this time    Recent Labs   Lab 01/13/25  1348 01/14/25  0510 01/15/25  0540 01/15/25  0819    139  --  140   K 3.8 3.7  --  4.7   CALCIUM 10.2 8.8  --  8.6*  "  PHOS  --  3.4 3.4  --    MG  --  2.2 1.9  --     108  --  110   CO2 23 18*  --  19*   BUN 27* 25*  --  21*   CREATININE 2.3* 1.6*  --  1.1   EGFRNORACEVR 36* 56*  --  >60   BILITOT 0.7 0.5  --  0.5   ALKPHOS 84 66  --  54   ALT 9* 8*  --  9*   AST 14 9*  --  8*   ALBUMIN 4.2 3.3*  --  3.0*   LIPASE 67*  --   --   --    WBC 15.67* 10.84  --  9.00   HGB 16.3 13.9*  --  13.3*   HCT 48.5 41.3  --  40.1     Nutrition Orders:  Diet Clear Liquid      Appetite/Oral Intake: not applicable/not applicable  Factors Affecting Nutritional Intake: clear liquid diet  Social Needs Impacting Access to Food: unable to assess at this time; will attempt on follow-up  Food/Hinduism/Cultural Preferences: unable to obtain  Food Allergies: no known food allergies  Last Bowel Movement: 01/12/25  Wound(s):  Skin intact per EMR    Comments    1/15/25: Dietitian working remotely. Attempted to call pt via room phone without success. Pt with complaints of abdominal pain, nausea, vomiting, and decreased PO intake for 4 days PTA. Pt reports 14-23 lb wt loss PTA per MST, will attempt to confirm at follow-up by on-site RD. Spoke with RN via secure chat, pt tolerating CLD well so far. Surgery reports no N/V. Recommend a low-fiber diet once appropriate to advance. Boost Breeze ordered for extra nourishment until diet has advanced.     Anthropometrics    Height: 5' 11" (180.3 cm), Height Method: Stated  Last Weight: 77.1 kg (169 lb 15.6 oz) (01/14/25 2308), Weight Method: Bed Scale  BMI (Calculated): 23.7  BMI Classification: normal (BMI 18.5-24.9)        Ideal Body Weight (IBW), Male: 172 lb     % Ideal Body Weight, Male (lb): 102.54 %                          Usual Weight Provided By: EMR weight history    Wt Readings from Last 5 Encounters:   01/14/25 77.1 kg (169 lb 15.6 oz)   01/13/25 80.8 kg (178 lb 3.9 oz)   04/29/24 91.9 kg (202 lb 9.6 oz)   04/02/24 90.2 kg (198 lb 15.4 oz)     Weight Change(s) Since Admission:   (1/15): -2.9 kg since " admit, suspect d/t variation in weight methods  Wt Readings from Last 1 Encounters:   01/14/25 2308 77.1 kg (169 lb 15.6 oz)   01/13/25 1220 80 kg (176 lb 5.9 oz)   Admit Weight: 80 kg (176 lb 5.9 oz) (01/13/25 1220), Weight Method: Standard Scale    Estimated Needs    Weight Used For Calorie Calculations: 77.1 kg (169 lb 15.6 oz)  Energy Calorie Requirements (kcal): 2220 kcals (1.3 SFxMSJ)  Energy Need Method: [place]-St Jeor  Weight Used For Protein Calculations: 77.1 kg (169 lb 15.6 oz)  Protein Requirements:  g (1.1-1.3 g/kg)  Fluid Requirements (mL): 2220 mL (1 mL/kcal) or per provider  CHO Requirement: 250 g/day (45% of total EEN)     Enteral Nutrition     Patient not receiving enteral nutrition at this time.    Parenteral Nutrition     Patient not receiving parenteral nutrition support at this time.    Evaluation of Received Nutrient Intake    Calories: not meeting estimated needs  Protein: not meeting estimated needs    Patient Education     Not applicable.    Nutrition Diagnosis     PES: Inadequate energy intake related to altered GI function as evidenced by diverticulitis, need for CLD since admit. (new)     PES:            Nutrition Interventions     Intervention(s): modified composition of meals/snacks and commercial beverage  Intervention(s):      Goal: Meet greater than 80% of nutritional needs by follow-up. (new)  Goal: Maintain weight throughout hospitalization. (new)    Nutrition Goals & Monitoring     Dietitian will monitor: energy intake, weight, and gastrointestinal profile  Discharge planning: too early to determine; pending clinical course  Nutrition Risk/Follow-Up: moderate (follow-up in 3-5 days)   Please consult if re-assessment needed sooner.

## 2025-01-15 NOTE — PROGRESS NOTES
[facility] - Med Surg 3  Colorectal Surgery  Progress Note    Subjective:     Interval History: pain improved but still present to RLQ. Tolerating CLD without n/v. Afebrile. WBC downtrended. Passing flatus.    Medications:  Continuous Infusions:   0.9% NaCl   Intravenous Continuous 75 mL/hr at 01/15/25 0647 Rate Verify at 01/15/25 0647     Scheduled Meds:   magnesium sulfate IVPB  2 g Intravenous Once    piperacillin-tazobactam (Zosyn) IV (PEDS and ADULTS) (extended infusion is not appropriate)  4.5 g Intravenous Q8H     PRN Meds:  Current Facility-Administered Medications:     acetaminophen, 650 mg, Oral, Q8H PRN    dextrose 50%, 12.5 g, Intravenous, PRN    dextrose 50%, 25 g, Intravenous, PRN    glucagon (human recombinant), 1 mg, Intramuscular, PRN    glucose, 16 g, Oral, PRN    glucose, 24 g, Oral, PRN    melatonin, 6 mg, Oral, Nightly PRN    morphine, 2 mg, Intravenous, Q3H PRN    morphine, 4 mg, Intravenous, Q3H PRN    naloxone, 0.02 mg, Intravenous, PRN    ondansetron, 4 mg, Intravenous, Q6H PRN    sodium chloride 0.9%, 10 mL, Intravenous, Q12H PRN    traZODone, 50 mg, Oral, Nightly PRN     Review of patient's allergies indicates:  No Known Allergies  Objective:     Vital Signs (Most Recent):  Temp: 97.4 °F (36.3 °C) (01/15/25 0808)  Pulse: (!) 56 (01/15/25 0808)  Resp: 18 (01/15/25 0947)  BP: 119/65 (01/15/25 0808)  SpO2: 97 % (01/15/25 0808) Vital Signs (24h Range):  Temp:  [97.4 °F (36.3 °C)-98.3 °F (36.8 °C)] 97.4 °F (36.3 °C)  Pulse:  [50-74] 56  Resp:  [16-20] 18  SpO2:  [97 %-98 %] 97 %  BP: (100-122)/(64-76) 119/65     Weight: 77.1 kg (169 lb 15.6 oz)  Body mass index is 23.71 kg/m².    Intake/Output - Last 3 Shifts         01/13 0700 01/14 0659 01/14 0700  01/15 0659 01/15 0700  01/16 0659    I.V. (mL/kg) 1000 (12.5) 2607.4 (33.8)     IV Piggyback 200 296.3     Total Intake(mL/kg) 1200 (15) 2903.7 (37.7)     Urine (mL/kg/hr)  700 (0.4) 300 (0.9)    Total Output  700 300    Net +1200 +2203.7 -300                     Physical Exam  Constitutional:       General: He is not in acute distress.     Appearance: Normal appearance.   HENT:      Head: Normocephalic and atraumatic.   Eyes:      Extraocular Movements: Extraocular movements intact.      Conjunctiva/sclera: Conjunctivae normal.   Cardiovascular:      Rate and Rhythm: Bradycardia present.   Pulmonary:      Effort: Pulmonary effort is normal.   Abdominal:      Comments: Soft, nondistended, +TTP RLQ; no rebound or guarding; no previous incisions   Musculoskeletal:         General: No swelling, tenderness, deformity or signs of injury. Normal range of motion.   Skin:     General: Skin is warm and dry.      Coloration: Skin is not jaundiced.   Neurological:      General: No focal deficit present.      Mental Status: He is alert and oriented to person, place, and time.   Psychiatric:         Mood and Affect: Mood normal.         Behavior: Behavior normal.          Significant Labs:  I have reviewed all pertinent lab results within the past 24 hours.  CBC:   Recent Labs   Lab 01/15/25  0819   WBC 9.00   RBC 4.33*   HGB 13.3*   HCT 40.1      MCV 93   MCH 30.7   MCHC 33.2     BMP:   Recent Labs   Lab 01/15/25  0540 01/15/25  0819   GLU  --  82   NA  --  140   K  --  4.7   CL  --  110   CO2  --  19*   BUN  --  21*   CREATININE  --  1.1   CALCIUM  --  8.6*   MG 1.9  --        Significant Diagnostics:  I have reviewed all pertinent imaging results/findings within the past 24 hours.  Assessment/Plan:     * Diverticulitis  38yo M with sigmoid diverticulitis vs small bowel inflammation concerning for IBD    - Patient with no leukocytosis and improved pain, can convert to p.o. antibiotics today.  - No acute surgical intervention required at this time.  Would ideally like to cool this area down with antibiotics and bowel rest and avoid surgical intervention during this hospitalization.  Discussed with the patient that if he requires surgery during this hospitalization and  increases his risk for ostomy formation.  He will likely require surgical resection eventually but we would hopefully perform this as an outpatient and he is amenable to this plan.  - Advanced to CLD this morning and monitoring for tolerance. Would continue CLD today   - Rest of care per primary team    BRENDON (acute kidney injury)  - Management per primary team         Adam Barnard PA-C  Colorectal Surgery  [facility] - Med Surg 3

## 2025-01-15 NOTE — SUBJECTIVE & OBJECTIVE
Interval History: Held in ER bed. Denies N/V. Tenderness to RLQ with palpation. No acute events overnight.    Review of Systems   Constitutional:  Negative for chills and fever.   HENT:  Negative for congestion and rhinorrhea.    Eyes:  Negative for discharge and visual disturbance.   Respiratory:  Negative for cough and shortness of breath.    Cardiovascular:  Negative for chest pain, palpitations and leg swelling.   Gastrointestinal:  Positive for abdominal pain (RLQ with palpation). Negative for constipation, diarrhea, nausea and vomiting.   Genitourinary:  Negative for difficulty urinating and dysuria.   Musculoskeletal:  Negative for arthralgias, back pain and myalgias.   Skin:  Negative for color change.   Neurological:  Negative for dizziness and headaches.   Psychiatric/Behavioral:  Negative for sleep disturbance.      Objective:     Vital Signs (Most Recent):  Temp: 97.4 °F (36.3 °C) (01/15/25 0808)  Pulse: (!) 56 (01/15/25 0808)  Resp: 18 (01/15/25 0947)  BP: 119/65 (01/15/25 0808)  SpO2: 97 % (01/15/25 0808) Vital Signs (24h Range):  Temp:  [97.4 °F (36.3 °C)-98.3 °F (36.8 °C)] 97.4 °F (36.3 °C)  Pulse:  [50-74] 56  Resp:  [16-20] 18  SpO2:  [97 %-98 %] 97 %  BP: (100-122)/(64-76) 119/65     Weight: 77.1 kg (169 lb 15.6 oz)  Body mass index is 23.71 kg/m².    Intake/Output Summary (Last 24 hours) at 1/15/2025 1137  Last data filed at 1/15/2025 0748  Gross per 24 hour   Intake 2803.74 ml   Output 1000 ml   Net 1803.74 ml         Physical Exam  Vitals and nursing note reviewed.   Constitutional:       General: He is not in acute distress.     Appearance: He is not ill-appearing, toxic-appearing or diaphoretic.   HENT:      Head: Normocephalic and atraumatic.      Mouth/Throat:      Mouth: Mucous membranes are moist.   Eyes:      General: No scleral icterus.        Right eye: No discharge.         Left eye: No discharge.      Pupils: Pupils are equal, round, and reactive to light.   Cardiovascular:      Rate  and Rhythm: Normal rate and regular rhythm.      Heart sounds: Normal heart sounds.   Pulmonary:      Effort: Pulmonary effort is normal. No respiratory distress.   Abdominal:      General: Bowel sounds are normal.      Tenderness: There is abdominal tenderness.   Musculoskeletal:      Cervical back: No rigidity.      Right lower leg: No edema.      Left lower leg: No edema.   Skin:     General: Skin is warm and dry.      Coloration: Skin is not jaundiced.   Neurological:      Mental Status: He is alert and oriented to person, place, and time. Mental status is at baseline.   Psychiatric:         Mood and Affect: Mood normal.         Behavior: Behavior normal.             Significant Labs: All pertinent labs within the past 24 hours have been reviewed.  Blood Culture:   Recent Labs   Lab 01/13/25  1635 01/13/25  2044 01/13/25 2054   LABBLOO No Growth to date  No Growth to date  No Growth to date  No Growth to date No Growth to date  No Growth to date No Growth to date  No Growth to date     BMP:   Recent Labs   Lab 01/15/25  0540 01/15/25  0819   GLU  --  82   NA  --  140   K  --  4.7   CL  --  110   CO2  --  19*   BUN  --  21*   CREATININE  --  1.1   CALCIUM  --  8.6*   MG 1.9  --      CBC:   Recent Labs   Lab 01/13/25  1348 01/14/25  0510 01/15/25  0819   WBC 15.67* 10.84 9.00   HGB 16.3 13.9* 13.3*   HCT 48.5 41.3 40.1   * 382 344     Lactic Acid:   Recent Labs   Lab 01/13/25  1637 01/14/25  0510   LACTATE 2.2 0.6     Urine Studies:   Recent Labs   Lab 01/13/25  1410   COLORU Yellow   APPEARANCEUA Clear   PHUR 6.0   SPECGRAV 1.015   PROTEINUA 1+*   GLUCUA Negative   KETONESU 2+*   BILIRUBINUA Negative   OCCULTUA 1+*   NITRITE Negative   UROBILINOGEN Negative   LEUKOCYTESUR Trace*   RBCUA 4   WBCUA 9*   BACTERIA Rare   HYALINECASTS 3*       Significant Imaging: I have reviewed all pertinent imaging results/findings within the past 24 hours.

## 2025-01-15 NOTE — ASSESSMENT & PLAN NOTE
38yo M with sigmoid diverticulitis vs small bowel inflammation concerning for IBD    - Patient with no leukocytosis and improved pain, can convert to p.o. antibiotics today.  - No acute surgical intervention required at this time.  Would ideally like to cool this area down with antibiotics and bowel rest and avoid surgical intervention during this hospitalization.  Discussed with the patient that if he requires surgery during this hospitalization and increases his risk for ostomy formation.  He will likely require surgical resection eventually but we would hopefully perform this as an outpatient and he is amenable to this plan.  - Advanced to CLD this morning and monitoring for tolerance. Would continue CLD today   - Rest of care per primary team

## 2025-01-15 NOTE — ASSESSMENT & PLAN NOTE
"CT - "focally perforated diverticulitis on a Surgery following, recommendations:   --no acute surgical intervention, background of acute on chronic inflammatory small bowel disease."    IV antibiotics switched to Augmentin 500-125 mg PO TID  IV fluids  Multimodal pain control  Clear liquid diet        "

## 2025-01-15 NOTE — PROGRESS NOTES
[facility] Surg [address] [place] [address] Medicine  Progress Note    Patient Name: German Rabago  MRN: [id number]  Patient Class: IP- Inpatient   Admission Date: 1/13/2025  Length of Stay: 2 days  Attending Physician: Scott Tejeda MD  Primary Care Provider: No primary care provider on file.        Subjective     Principal Problem:Diverticulitis        HPI:  38 y/o male with PMHx of anxiety disorder, depression, tobacco use disorder, marijuana use disorder presents to the ER with complaints of RLQ abdominal pain, nausea, vomiting, decreased PO intake for the past 4 days. He was seen a UC, prescribed cipro, flagyl, and lactulose for diverticular disease and constipation. He reports worsening of symptoms, unable to tolerate PO intake, decreased UOP, fevers/chills. In ER, lab signficiant for leukocyotosis (WBC 16k), BRENDON (BUN/Cr 27/2.3); CT A/P with diverticulitis, possible focal perforation. Surgery consulted, recommended conservative management. Admit as inpatient.    Overview/Hospital Course:  38 y/o male, comorbid conditions include anxiety d/o, depression, tobacco use d/o, THC use, to ED with RLQ pain, N/V, and decreased PO tolerance over past 4 days. Seen previously at  and treated with abx and lactulose for constipation. When he was unable to tolerate PO intake and had decr UOP, fever/chills he sought ER care. CT - positive for diverticulitis, possible focal perf. Surgery consulted and recommended: no acute surgical intervention, will be seen by colorectal surgery tomorrowconservative management. Colorectal surgery recommends bowel rest with possible OP surgical intervention at later date.    12/15/2025  Abx switched to Augmentin 875-125 mg PO TID per colorectal surgery on secure chat. Diet clear liquids.Pain controlled.    Interval History: No acute events overnight. Denies N/V. Tenderness to RLQ with palpation continues. Switched to clear liquids.    Review of Systems   Constitutional:  Negative for chills and fever.    HENT:  Negative for congestion and rhinorrhea.    Eyes:  Negative for discharge and visual disturbance.   Respiratory:  Negative for cough and shortness of breath.    Cardiovascular:  Negative for chest pain, palpitations and leg swelling.   Gastrointestinal:  Positive for abdominal pain (RLQ with palpation). Negative for constipation, diarrhea, nausea and vomiting.   Genitourinary:  Negative for difficulty urinating and dysuria.   Musculoskeletal:  Negative for arthralgias, back pain and myalgias.   Skin:  Negative for color change.   Neurological:  Negative for dizziness and headaches.   Psychiatric/Behavioral:  Negative for sleep disturbance.      Objective:     Vital Signs (Most Recent):  Temp: 97.4 °F (36.3 °C) (01/15/25 0808)  Pulse: (!) 56 (01/15/25 0808)  Resp: 18 (01/15/25 0947)  BP: 119/65 (01/15/25 0808)  SpO2: 97 % (01/15/25 0808) Vital Signs (24h Range):  Temp:  [97.4 °F (36.3 °C)-98.3 °F (36.8 °C)] 97.4 °F (36.3 °C)  Pulse:  [50-74] 56  Resp:  [16-20] 18  SpO2:  [97 %-98 %] 97 %  BP: (100-122)/(64-76) 119/65     Weight: 77.1 kg (169 lb 15.6 oz)  Body mass index is 23.71 kg/m².    Intake/Output Summary (Last 24 hours) at 1/15/2025 1137  Last data filed at 1/15/2025 0748  Gross per 24 hour   Intake 2803.74 ml   Output 1000 ml   Net 1803.74 ml         Physical Exam  Vitals and nursing note reviewed.   Constitutional:       General: He is not in acute distress.     Appearance: He is not ill-appearing, toxic-appearing or diaphoretic.   HENT:      Head: Normocephalic and atraumatic.      Mouth/Throat:      Mouth: Mucous membranes are moist.   Eyes:      General: No scleral icterus.        Right eye: No discharge.         Left eye: No discharge.      Pupils: Pupils are equal, round, and reactive to light.   Cardiovascular:      Rate and Rhythm: Normal rate and regular rhythm.      Heart sounds: Normal heart sounds.   Pulmonary:      Effort: Pulmonary effort is normal. No respiratory distress.   Abdominal:  "     General: Bowel sounds are normal.      Tenderness: There is abdominal tenderness.   Musculoskeletal:      Cervical back: No rigidity.      Right lower leg: No edema.      Left lower leg: No edema.   Skin:     General: Skin is warm and dry.      Coloration: Skin is not jaundiced.   Neurological:      Mental Status: He is alert and oriented to person, place, and time. Mental status is at baseline.   Psychiatric:         Mood and Affect: Mood normal.         Behavior: Behavior normal.             Significant Labs: All pertinent labs within the past 24 hours have been reviewed.  Blood Culture:   Recent Labs   Lab 01/13/25  1635 01/13/25  2044 01/13/25 2054   LABBLOO No Growth to date  No Growth to date  No Growth to date  No Growth to date No Growth to date  No Growth to date No Growth to date  No Growth to date     BMP:   Recent Labs   Lab 01/15/25  0540 01/15/25  0819   GLU  --  82   NA  --  140   K  --  4.7   CL  --  110   CO2  --  19*   BUN  --  21*   CREATININE  --  1.1   CALCIUM  --  8.6*   MG 1.9  --      CBC:   Recent Labs   Lab 01/13/25  1348 01/14/25  0510 01/15/25  0819   WBC 15.67* 10.84 9.00   HGB 16.3 13.9* 13.3*   HCT 48.5 41.3 40.1   * 382 344     Lactic Acid:   Recent Labs   Lab 01/13/25  1637 01/14/25  0510   LACTATE 2.2 0.6     Urine Studies:   Recent Labs   Lab 01/13/25  1410   COLORU Yellow   APPEARANCEUA Clear   PHUR 6.0   SPECGRAV 1.015   PROTEINUA 1+*   GLUCUA Negative   KETONESU 2+*   BILIRUBINUA Negative   OCCULTUA 1+*   NITRITE Negative   UROBILINOGEN Negative   LEUKOCYTESUR Trace*   RBCUA 4   WBCUA 9*   BACTERIA Rare   HYALINECASTS 3*       Significant Imaging: I have reviewed all pertinent imaging results/findings within the past 24 hours.    Assessment and Plan     * Diverticulitis  CT - "focally perforated diverticulitis on a Surgery following, recommendations:   --no acute surgical intervention, background of acute on chronic inflammatory small bowel disease."    IV " antibiotics switched to Augmentin 875-125 mg PO TID  IV fluids  Multimodal pain control  Clear liquid diet          Sepsis  This patient does have evidence of infective focus  My overall impression is sepsis.  Source: Abdominal  Antibiotics given-   Antibiotics (72h ago, onward)      Start     Stop Route Frequency Ordered    01/14/25 1830  piperacillin-tazobactam (ZOSYN) 4.5 g in D5W 100 mL IVPB (MB+)         -- IV Every 8 hours (non-standard times) 01/14/25 1829          Latest lactate reviewed-  Recent Labs   Lab 01/14/25  0510   LACTATE 0.6       Organ dysfunction indicated by Acute kidney injury    Fluid challenge Not needed - patient is not hypotensive      Post- resuscitation assessment No - Post resuscitation assessment not needed       Will Not start Pressors- Levophed for MAP of 65  Source control achieved by: IV antibiotics    BRENDON (acute kidney injury)  BRENDON is likely due to pre-renal azotemia due to intravascular volume depletion. Baseline creatinine is  0.8 . Most recent creatinine and eGFR are listed below.  Recent Labs     01/13/25  1348 01/14/25  0510   CREATININE 2.3* 1.6*   EGFRNORACEVR 36* 56*        Plan  - BRENDON is improving. Will adjust treatment as follows: IV fluids  - Avoid nephrotoxins and renally dose meds for GFR listed above  - Monitor urine output, serial BMP, and adjust therapy as needed      VTE Risk Mitigation (From admission, onward)           Ordered     IP VTE LOW RISK PATIENT  Once         01/13/25 1935     Place sequential compression device  Until discontinued         01/13/25 1935                    Discharge Planning   FARRUKH:      Code Status: Full Code   Medical Readiness for Discharge Date:   Discharge Plan A: Home                        NAFISA DeeP-C  Department of Hospital Medicine   [facility] - Med Surg 3

## 2025-01-15 NOTE — ASSESSMENT & PLAN NOTE
This patient does have evidence of infective focus  My overall impression is sepsis.  Source: Abdominal  Antibiotics given-   Antibiotics (72h ago, onward)    Start     Stop Route Frequency Ordered    01/14/25 1830  piperacillin-tazobactam (ZOSYN) 4.5 g in D5W 100 mL IVPB (MB+)         -- IV Every 8 hours (non-standard times) 01/14/25 1829        Latest lactate reviewed-  Recent Labs   Lab 01/14/25  0510   LACTATE 0.6       Organ dysfunction indicated by Acute kidney injury    Fluid challenge Not needed - patient is not hypotensive      Post- resuscitation assessment No - Post resuscitation assessment not needed       Will Not start Pressors- Levophed for MAP of 65  Source control achieved by: IV antibiotics

## 2025-01-16 VITALS
OXYGEN SATURATION: 99 % | HEART RATE: 74 BPM | TEMPERATURE: 98 F | HEIGHT: 71 IN | RESPIRATION RATE: 16 BRPM | SYSTOLIC BLOOD PRESSURE: 146 MMHG | DIASTOLIC BLOOD PRESSURE: 85 MMHG | BODY MASS INDEX: 24.19 KG/M2 | WEIGHT: 172.81 LBS

## 2025-01-16 LAB
ALBUMIN SERPL BCP-MCNC: 2.9 G/DL (ref 3.5–5.2)
ALP SERPL-CCNC: 53 U/L (ref 40–150)
ALT SERPL W/O P-5'-P-CCNC: 6 U/L (ref 10–44)
ANION GAP SERPL CALC-SCNC: 11 MMOL/L (ref 8–16)
AST SERPL-CCNC: 11 U/L (ref 10–40)
BASOPHILS # BLD AUTO: 0.08 K/UL (ref 0–0.2)
BASOPHILS NFR BLD: 0.8 % (ref 0–1.9)
BILIRUB SERPL-MCNC: 0.4 MG/DL (ref 0.1–1)
BUN SERPL-MCNC: 12 MG/DL (ref 6–20)
CALCIUM SERPL-MCNC: 8.4 MG/DL (ref 8.7–10.5)
CHLORIDE SERPL-SCNC: 110 MMOL/L (ref 95–110)
CO2 SERPL-SCNC: 17 MMOL/L (ref 23–29)
CREAT SERPL-MCNC: 0.8 MG/DL (ref 0.5–1.4)
DIFFERENTIAL METHOD BLD: ABNORMAL
EOSINOPHIL # BLD AUTO: 0.3 K/UL (ref 0–0.5)
EOSINOPHIL NFR BLD: 2.9 % (ref 0–8)
ERYTHROCYTE [DISTWIDTH] IN BLOOD BY AUTOMATED COUNT: 13.1 % (ref 11.5–14.5)
EST. GFR  (NO RACE VARIABLE): >60 ML/MIN/1.73 M^2
GLUCOSE SERPL-MCNC: 77 MG/DL (ref 70–110)
HCT VFR BLD AUTO: 39.8 % (ref 40–54)
HGB BLD-MCNC: 13.1 G/DL (ref 14–18)
IMM GRANULOCYTES # BLD AUTO: 0.06 K/UL (ref 0–0.04)
IMM GRANULOCYTES NFR BLD AUTO: 0.6 % (ref 0–0.5)
LYMPHOCYTES # BLD AUTO: 3 K/UL (ref 1–4.8)
LYMPHOCYTES NFR BLD: 31.2 % (ref 18–48)
MAGNESIUM SERPL-MCNC: 1.6 MG/DL (ref 1.6–2.6)
MCH RBC QN AUTO: 30 PG (ref 27–31)
MCHC RBC AUTO-ENTMCNC: 32.9 G/DL (ref 32–36)
MCV RBC AUTO: 91 FL (ref 82–98)
MONOCYTES # BLD AUTO: 0.7 K/UL (ref 0.3–1)
MONOCYTES NFR BLD: 7.4 % (ref 4–15)
NEUTROPHILS # BLD AUTO: 5.4 K/UL (ref 1.8–7.7)
NEUTROPHILS NFR BLD: 57.1 % (ref 38–73)
NRBC BLD-RTO: 0 /100 WBC
PHOSPHATE SERPL-MCNC: 3 MG/DL (ref 2.7–4.5)
PLATELET # BLD AUTO: 336 K/UL (ref 150–450)
PMV BLD AUTO: 10 FL (ref 9.2–12.9)
POTASSIUM SERPL-SCNC: 3.9 MMOL/L (ref 3.5–5.1)
PROT SERPL-MCNC: 5.9 G/DL (ref 6–8.4)
RBC # BLD AUTO: 4.37 M/UL (ref 4.6–6.2)
SODIUM SERPL-SCNC: 138 MMOL/L (ref 136–145)
WBC # BLD AUTO: 9.47 K/UL (ref 3.9–12.7)

## 2025-01-16 PROCEDURE — 63600175 PHARM REV CODE 636 W HCPCS: Performed by: STUDENT IN AN ORGANIZED HEALTH CARE EDUCATION/TRAINING PROGRAM

## 2025-01-16 PROCEDURE — 25000003 PHARM REV CODE 250

## 2025-01-16 PROCEDURE — 83735 ASSAY OF MAGNESIUM: CPT | Performed by: HOSPITALIST

## 2025-01-16 PROCEDURE — 25000003 PHARM REV CODE 250: Performed by: HOSPITALIST

## 2025-01-16 PROCEDURE — 36415 COLL VENOUS BLD VENIPUNCTURE: CPT | Performed by: HOSPITALIST

## 2025-01-16 PROCEDURE — 85025 COMPLETE CBC W/AUTO DIFF WBC: CPT

## 2025-01-16 PROCEDURE — 80053 COMPREHEN METABOLIC PANEL: CPT

## 2025-01-16 PROCEDURE — 99232 SBSQ HOSP IP/OBS MODERATE 35: CPT | Mod: ,,, | Performed by: COLON & RECTAL SURGERY

## 2025-01-16 PROCEDURE — 84100 ASSAY OF PHOSPHORUS: CPT | Performed by: HOSPITALIST

## 2025-01-16 RX ORDER — HYDROCODONE BITARTRATE AND ACETAMINOPHEN 10; 325 MG/1; MG/1
1 TABLET ORAL EVERY 6 HOURS PRN
Status: DISCONTINUED | OUTPATIENT
Start: 2025-01-16 | End: 2025-01-16 | Stop reason: HOSPADM

## 2025-01-16 RX ORDER — AMOXICILLIN AND CLAVULANATE POTASSIUM 875; 125 MG/1; MG/1
1 TABLET, FILM COATED ORAL EVERY 12 HOURS
Qty: 10 TABLET | Refills: 0 | Status: SHIPPED | OUTPATIENT
Start: 2025-01-16 | End: 2025-01-21

## 2025-01-16 RX ORDER — HYDROCODONE BITARTRATE AND ACETAMINOPHEN 5; 325 MG/1; MG/1
1 TABLET ORAL EVERY 4 HOURS PRN
Qty: 12 TABLET | Refills: 0 | Status: SHIPPED | OUTPATIENT
Start: 2025-01-16

## 2025-01-16 RX ORDER — HYDROCODONE BITARTRATE AND ACETAMINOPHEN 5; 325 MG/1; MG/1
1 TABLET ORAL EVERY 6 HOURS PRN
Status: DISCONTINUED | OUTPATIENT
Start: 2025-01-16 | End: 2025-01-16 | Stop reason: HOSPADM

## 2025-01-16 RX ADMIN — AMOXICILLIN AND CLAVULANATE POTASSIUM 1 TABLET: 875; 125 TABLET, FILM COATED ORAL at 08:01

## 2025-01-16 RX ADMIN — MORPHINE SULFATE 2 MG: 4 INJECTION INTRAVENOUS at 05:01

## 2025-01-16 RX ADMIN — SODIUM CHLORIDE: 9 INJECTION, SOLUTION INTRAVENOUS at 05:01

## 2025-01-16 RX ADMIN — HYDROCODONE BITARTRATE AND ACETAMINOPHEN 1 TABLET: 10; 325 TABLET ORAL at 11:01

## 2025-01-16 NOTE — ASSESSMENT & PLAN NOTE
This patient does have evidence of infective focus  My overall impression is sepsis.  Source: Abdominal  Antibiotics given-   Antibiotics (72h ago, onward)      Start     Stop Route Frequency Ordered    01/15/25 1300  amoxicillin-clavulanate 875-125mg per tablet 1 tablet         -- Oral Every 12 hours 01/15/25 1154          Latest lactate reviewed-  Recent Labs   Lab 01/14/25  0510   LACTATE 0.6         Patient stable for discharge. No evidence of sepsis or infection.

## 2025-01-16 NOTE — ASSESSMENT & PLAN NOTE
BRENDON is likely due to pre-renal azotemia due to intravascular volume depletion. Baseline creatinine is  0.8 . Most recent creatinine and eGFR are listed below.  Recent Labs     01/14/25  0510 01/15/25  0819 01/16/25  0526   CREATININE 1.6* 1.1 0.8   EGFRNORACEVR 56* >60 >60        Plan  - BRENDON Resolved

## 2025-01-16 NOTE — PLAN OF CARE
[facility] - Med [facility]  Discharge Final Note    Primary Care Provider: No primary care provider on file.    Expected Discharge Date: 1/16/2025    Final Discharge Note (most recent)       Final Note - 01/16/25 1528          Final Note    Assessment Type Final Discharge Note     Anticipated Discharge Disposition Home or Self Care     Hospital Resources/Appts/Education Provided Appointments scheduled and added to AVS                     Important Message from Medicare             Contact Info       Ignacio Aldana MD   Specialty: Colon and Rectal Surgery    [address] [place] [address]   Phone: [phone]       Next Steps: Schedule an appointment as soon as possible for a visit in 2 week(s)    [facility] - Internal Medicine   Specialty: Internal Medicine    [id number] [place] [address] [address] [place] [address]   Phone: [phone]       Next Steps: Schedule an appointment as soon as possible for a visit          DC Dispo: home  Surgery f/u: Feb 3  PCP f/u: CM unable to schedule within 2 weeks. Message sent to clinic staff.

## 2025-01-16 NOTE — ASSESSMENT & PLAN NOTE
"CT - "focally perforated diverticulitis on a Surgery following, recommendations:   --no acute surgical intervention, background of acute on chronic inflammatory small bowel disease."    IV antibiotics Augmentin 875-125 mg PO TID for 7 days.  Follow up in GI clinic in 2 weeks.      "

## 2025-01-16 NOTE — PROGRESS NOTES
[facility] - [facility] 3  Colorectal Surgery  Progress Note    Subjective:     Interval History: Feeling better. Less abdominal pain. Tolerating CLD. +BM/flatus.    Medications:  Continuous Infusions:   0.9% NaCl   Intravenous Continuous 75 mL/hr at 01/16/25 0639 Rate Verify at 01/16/25 0639     Scheduled Meds:   amoxicillin-clavulanate 875-125mg  1 tablet Oral Q12H     PRN Meds:  Current Facility-Administered Medications:     acetaminophen, 650 mg, Oral, Q8H PRN    dextrose 50%, 12.5 g, Intravenous, PRN    dextrose 50%, 25 g, Intravenous, PRN    glucagon (human recombinant), 1 mg, Intramuscular, PRN    glucose, 16 g, Oral, PRN    glucose, 24 g, Oral, PRN    melatonin, 6 mg, Oral, Nightly PRN    morphine, 2 mg, Intravenous, Q3H PRN    morphine, 4 mg, Intravenous, Q3H PRN    naloxone, 0.02 mg, Intravenous, PRN    ondansetron, 4 mg, Intravenous, Q6H PRN    sodium chloride 0.9%, 10 mL, Intravenous, Q12H PRN    traZODone, 50 mg, Oral, Nightly PRN     Review of patient's allergies indicates:  No Known Allergies  Objective:     Vital Signs (Most Recent):  Temp: 98.3 °F (36.8 °C) (01/16/25 0731)  Pulse: 67 (01/16/25 0731)  Resp: 19 (01/16/25 0731)  BP: 125/69 (01/16/25 0731)  SpO2: 97 % (01/16/25 0731) Vital Signs (24h Range):  Temp:  [98 °F (36.7 °C)-98.7 °F (37.1 °C)] 98.3 °F (36.8 °C)  Pulse:  [48-67] 67  Resp:  [16-20] 19  SpO2:  [97 %-99 %] 97 %  BP: (109-135)/(59-91) 125/69     Weight: 78.4 kg (172 lb 13.5 oz)  Body mass index is 24.11 kg/m².    Intake/Output - Last 3 Shifts         01/14 0700  01/15 0659 01/15 0700  01/16 0659 01/16 0700  01/17 0659    P.O.   120    I.V. (mL/kg) 2607.4 (33.8) 1785.3 (22.8)     IV Piggyback 296.3      Total Intake(mL/kg) 2903.7 (37.7) 1785.3 (22.8) 120 (1.5)    Urine (mL/kg/hr) 700 (0.4) 1300 (0.7) 250 (1.4)    Stool  0     Total Output 700 1300 250    Net +2203.7 +485.3 -130           Stool Occurrence  1 x              Physical Exam  Constitutional:       General: He is not in acute  distress.     Appearance: Normal appearance.   HENT:      Head: Normocephalic and atraumatic.   Eyes:      Extraocular Movements: Extraocular movements intact.      Conjunctiva/sclera: Conjunctivae normal.   Cardiovascular:      Rate and Rhythm: Normal rate.   Pulmonary:      Effort: Pulmonary effort is normal.   Abdominal:      Comments: Soft, nondistended, +minimal TTP RLQ; no rebound or guarding; no previous incisions   Musculoskeletal:         General: No swelling, tenderness, deformity or signs of injury. Normal range of motion.   Skin:     General: Skin is warm and dry.      Coloration: Skin is not jaundiced.   Neurological:      General: No focal deficit present.      Mental Status: He is alert and oriented to person, place, and time.   Psychiatric:         Mood and Affect: Mood normal.         Behavior: Behavior normal.         Thought Content: Thought content normal.          Significant Labs:  I have reviewed all pertinent lab results within the past 24 hours.  CBC:   Recent Labs   Lab 01/16/25  0526   WBC 9.47   RBC 4.37*   HGB 13.1*   HCT 39.8*      MCV 91   MCH 30.0   MCHC 32.9     BMP:   Recent Labs   Lab 01/16/25  0526   GLU 77      K 3.9      CO2 17*   BUN 12   CREATININE 0.8   CALCIUM 8.4*   MG 1.6       Significant Diagnostics:  I have reviewed all pertinent imaging results/findings within the past 24 hours.  Assessment/Plan:     * Diverticulitis  38yo M with sigmoid diverticulitis vs small bowel inflammation concerning for IBD    - trial reg diet  - cont PO abx x7 days  - okay for DC from CRS perspective if tolerates reg diet. Can followup in clinic in 2 weeks or sooner if necessary  - Rest of care per primary team    BRENDON (acute kidney injury)  - Management per primary team         Ignacio Aldana MD  Colorectal Surgery  [facility] - [facility]

## 2025-01-16 NOTE — SUBJECTIVE & OBJECTIVE
Interval History: Feeling better. Less abdominal pain. Tolerating CLD. +BM/flatus.    Medications:  Continuous Infusions:   0.9% NaCl   Intravenous Continuous 75 mL/hr at 01/16/25 0639 Rate Verify at 01/16/25 0639     Scheduled Meds:   amoxicillin-clavulanate 875-125mg  1 tablet Oral Q12H     PRN Meds:  Current Facility-Administered Medications:     acetaminophen, 650 mg, Oral, Q8H PRN    dextrose 50%, 12.5 g, Intravenous, PRN    dextrose 50%, 25 g, Intravenous, PRN    glucagon (human recombinant), 1 mg, Intramuscular, PRN    glucose, 16 g, Oral, PRN    glucose, 24 g, Oral, PRN    melatonin, 6 mg, Oral, Nightly PRN    morphine, 2 mg, Intravenous, Q3H PRN    morphine, 4 mg, Intravenous, Q3H PRN    naloxone, 0.02 mg, Intravenous, PRN    ondansetron, 4 mg, Intravenous, Q6H PRN    sodium chloride 0.9%, 10 mL, Intravenous, Q12H PRN    traZODone, 50 mg, Oral, Nightly PRN     Review of patient's allergies indicates:  No Known Allergies  Objective:     Vital Signs (Most Recent):  Temp: 98.3 °F (36.8 °C) (01/16/25 0731)  Pulse: 67 (01/16/25 0731)  Resp: 19 (01/16/25 0731)  BP: 125/69 (01/16/25 0731)  SpO2: 97 % (01/16/25 0731) Vital Signs (24h Range):  Temp:  [98 °F (36.7 °C)-98.7 °F (37.1 °C)] 98.3 °F (36.8 °C)  Pulse:  [48-67] 67  Resp:  [16-20] 19  SpO2:  [97 %-99 %] 97 %  BP: (109-135)/(59-91) 125/69     Weight: 78.4 kg (172 lb 13.5 oz)  Body mass index is 24.11 kg/m².    Intake/Output - Last 3 Shifts         01/14 0700  01/15 0659 01/15 0700  01/16 0659 01/16 0700  01/17 0659    P.O.   120    I.V. (mL/kg) 2607.4 (33.8) 1785.3 (22.8)     IV Piggyback 296.3      Total Intake(mL/kg) 2903.7 (37.7) 1785.3 (22.8) 120 (1.5)    Urine (mL/kg/hr) 700 (0.4) 1300 (0.7) 250 (1.4)    Stool  0     Total Output 700 1300 250    Net +2203.7 +485.3 -130           Stool Occurrence  1 x              Physical Exam  Constitutional:       General: He is not in acute distress.     Appearance: Normal appearance.   HENT:      Head: Normocephalic  and atraumatic.   Eyes:      Extraocular Movements: Extraocular movements intact.      Conjunctiva/sclera: Conjunctivae normal.   Cardiovascular:      Rate and Rhythm: Normal rate.   Pulmonary:      Effort: Pulmonary effort is normal.   Abdominal:      Comments: Soft, nondistended, +minimal TTP RLQ; no rebound or guarding; no previous incisions   Musculoskeletal:         General: No swelling, tenderness, deformity or signs of injury. Normal range of motion.   Skin:     General: Skin is warm and dry.      Coloration: Skin is not jaundiced.   Neurological:      General: No focal deficit present.      Mental Status: He is alert and oriented to person, place, and time.   Psychiatric:         Mood and Affect: Mood normal.         Behavior: Behavior normal.         Thought Content: Thought content normal.          Significant Labs:  I have reviewed all pertinent lab results within the past 24 hours.  CBC:   Recent Labs   Lab 01/16/25  0526   WBC 9.47   RBC 4.37*   HGB 13.1*   HCT 39.8*      MCV 91   MCH 30.0   MCHC 32.9     BMP:   Recent Labs   Lab 01/16/25  0526   GLU 77      K 3.9      CO2 17*   BUN 12   CREATININE 0.8   CALCIUM 8.4*   MG 1.6       Significant Diagnostics:  I have reviewed all pertinent imaging results/findings within the past 24 hours.

## 2025-01-16 NOTE — NURSING
Pt discharged home    PIV removed    Tele monitor removed    AVS reveiwed    New Rx reviewed    F/U appts discussed    NAD

## 2025-01-16 NOTE — DISCHARGE SUMMARY
[facility] Med Surg 3  [facility] Medicine  Discharge Summary      Patient Name: German Rabago  MRN: [id number]  DARLING: [id number]  Patient Class: IP- Inpatient  Admission Date: 1/13/2025  Hospital Length of Stay: 3 days  Discharge Date and Time:  01/16/2025 2:41 PM  Attending Physician: Scott Tejeda MD   Discharging Provider: JOSS Dee  Primary Care Provider: No primary care provider on file.    Primary Care Team: Networked reference to record PCT     HPI:   38 y/o male with PMHx of anxiety disorder, depression, tobacco use disorder, marijuana use disorder presents to the ER with complaints of RLQ abdominal pain, nausea, vomiting, decreased PO intake for the past 4 days. He was seen a UC, prescribed cipro, flagyl, and lactulose for diverticular disease and constipation. He reports worsening of symptoms, unable to tolerate PO intake, decreased UOP, fevers/chills. In ER, lab signficiant for leukocyotosis (WBC 16k), BRENDON (BUN/Cr 27/2.3); CT A/P with diverticulitis, possible focal perforation. Surgery consulted, recommended conservative management. Admit as inpatient.    * No surgery found *      Hospital Course:   38 y/o male, comorbid conditions include anxiety d/o, depression, tobacco use d/o, THC use, to ED with RLQ pain, N/V, and decreased PO tolerance over past 4 days. Seen previously at  and treated with abx and lactulose for constipation. When he was unable to tolerate PO intake and had decr UOP, fever/chills he sought ER care. CT - positive for diverticulitis, possible focal perf. Surgery consulted and recommended: no acute surgical intervention. Seen by colorectal surgery recommending conservative management, bowel rest with possible OP surgical intervention at later date.    12/15/2025  Abx switched to Augmentin 875-125 mg PO TID per colorectal surgery on secure chat. Diet clear liquids.Pain controlled.    12/16/2025  Diet advanced to Regular and tolerating. OK for discharge by GI. Minimal residual  "RLQ abd pain to palpation, denies n/v. Discharged home with home meds, add Augmentin 875-125 PO TID for 5 days  to complete a 7 day course, and Norco 5-325 mg for pain. Follow up information included in discharge documents. I have seen and evaluated this patients and he is stable for discharge.     Goals of Care Treatment Preferences:  Code Status: Full Code      SDOH Screening:  The patient was screened for utility difficulties, food insecurity, transport difficulties, housing insecurity, and interpersonal safety and there were no concerns identified this admission.     Consults:   Consults (From admission, onward)          Status Ordering Provider     Inpatient consult to Registered Dietitian/Nutritionist  Once        Provider:  (Not yet assigned)    Completed STEFAN GANDARA     Inpatient consult to General surgery  Once        Provider:  Aria Rivas MD    Completed MINO CESPEDES     Inpatient consult to General surgery  Once        Provider:  Aria Rivas MD    Completed RICHARD CARPIO            * Diverticulitis  CT - "focally perforated diverticulitis on a Surgery following, recommendations:   --no acute surgical intervention, background of acute on chronic inflammatory small bowel disease."    IV antibiotics Augmentin 875-125 mg PO TID for 7 days.  Follow up in GI clinic in 2 weeks.        Sepsis  This patient does have evidence of infective focus  My overall impression is sepsis.  Source: Abdominal  Antibiotics given-   Antibiotics (72h ago, onward)      Start     Stop Route Frequency Ordered    01/15/25 1300  amoxicillin-clavulanate 875-125mg per tablet 1 tablet         -- Oral Every 12 hours 01/15/25 1154          Latest lactate reviewed-  Recent Labs   Lab 01/14/25  0510   LACTATE 0.6         Patient stable for discharge. No evidence of sepsis or infection.        BRENDON (acute kidney injury)  BRENDON is likely due to pre-renal azotemia due to intravascular volume depletion. Baseline " creatinine is  0.8 . Most recent creatinine and eGFR are listed below.  Recent Labs     01/14/25  0510 01/15/25  0819 01/16/25  0526   CREATININE 1.6* 1.1 0.8   EGFRNORACEVR 56* >60 >60        Plan  - BRENDON Resolved        Final Active Diagnoses:    Diagnosis Date Noted POA    PRINCIPAL PROBLEM:  Diverticulitis [K57.92] 01/13/2025 Yes    BRENDON (acute kidney injury) [N17.9] 01/13/2025 Yes    Sepsis [A41.9] 01/13/2025 Yes      Problems Resolved During this Admission:       Discharged Condition: stable    Disposition:     Follow Up:   Follow-up Information       Ignacio Aldana MD. Schedule an appointment as soon as possible for a visit in 2 week(s).    Specialty: Colon and Rectal Surgery  Contact information:  [address] [place] [address] [phone]               [facility] - Internal Medicine. Schedule an appointment as soon as possible for a visit.    Specialty: Internal Medicine  Contact information:  [address] [place] [address] [phone]  Additional information:  Please take [address] for the Medical Office Building. Check in on the 1st floor.                         Patient Instructions:   No discharge procedures on file.    Significant Diagnostic Studies: Labs: BMP:   Recent Labs   Lab 01/15/25  0540 01/15/25  0819 01/16/25  0526   GLU  --  82 77   NA  --  140 138   K  --  4.7 3.9   CL  --  110 110   CO2  --  19* 17*   BUN  --  21* 12   CREATININE  --  1.1 0.8   CALCIUM  --  8.6* 8.4*   MG 1.9  --  1.6    and CBC   Recent Labs   Lab 01/15/25  0819 01/16/25  0526   WBC 9.00 9.47   HGB 13.3* 13.1*   HCT 40.1 39.8*    336     Microbiology: Blood Culture   Lab Results   Component Value Date    LABBLOO No Growth to date 01/13/2025    LABBLOO No Growth to date 01/13/2025    LABBLOO No Growth to date 01/13/2025     CT Abdomen Pelvis  Without Contrast   Final Result      Findings probably representing focally perforated diverticulitis on a background of acute on  chronic inflammatory small bowel disease.  Alternatively the inflamed small bowel may be totally secondary to the perforated colonic diverticulum.  Fat within the wall of the distal small bowel suggests a history of infectious and/or inflammatory distal ileitis.  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  No drainable fluid collection.  Colonoscopy is recommended when clinically appropriate to rule out underlying colonic mass.  Consider surgical consultation to aid in management.      Findings communicated to MARSHALL Rivero by epic chat on January 13, 2025 at 15:43.         Electronically signed by: Raymon Mauricio   Date:    01/13/2025   Time:    15:44           Pending Diagnostic Studies:       None           Medications:  Reconciled Home Medications:      Medication List        ASK your doctor about these medications      ciprofloxacin HCl 500 MG tablet  Commonly known as: CIPRO  Take 500 mg by mouth every 12 (twelve) hours.     ibuprofen 800 MG tablet  Commonly known as: ADVIL,MOTRIN  Take 1 tablet (800 mg total) by mouth 3 (three) times daily as needed for Pain.     metroNIDAZOLE 500 MG tablet  Commonly known as: FLAGYL  Take 500 mg by mouth every 12 (twelve) hours.              Indwelling Lines/Drains at time of discharge:   Lines/Drains/Airways       None                   Time spent on the discharge of patient: 31 minutes         JOSEPH Dee-C  Department of Hospital Medicine  [facility] Med [facility]

## 2025-01-16 NOTE — ASSESSMENT & PLAN NOTE
38yo M with sigmoid diverticulitis vs small bowel inflammation concerning for IBD    - trial reg diet  - cont PO abx x7 days  - okay for DC from CRS perspective if tolerates reg diet. Can followup in clinic in 2 weeks or sooner if necessary  - Rest of care per primary team

## 2025-01-19 LAB
BACTERIA BLD CULT: NORMAL

## 2025-01-27 ENCOUNTER — OFFICE VISIT (OUTPATIENT)
Dept: FAMILY MEDICINE | Facility: CLINIC | Age: 40
End: 2025-01-27
Payer: COMMERCIAL

## 2025-01-27 VITALS
HEIGHT: 71 IN | OXYGEN SATURATION: 99 % | SYSTOLIC BLOOD PRESSURE: 120 MMHG | RESPIRATION RATE: 18 BRPM | BODY MASS INDEX: 26.05 KG/M2 | TEMPERATURE: 98 F | HEART RATE: 88 BPM | DIASTOLIC BLOOD PRESSURE: 74 MMHG | WEIGHT: 186.06 LBS

## 2025-01-27 DIAGNOSIS — Z09 HOSPITAL DISCHARGE FOLLOW-UP: ICD-10-CM

## 2025-01-27 DIAGNOSIS — K57.92 DIVERTICULITIS: Primary | ICD-10-CM

## 2025-01-27 PROBLEM — E78.5 DYSLIPIDEMIA: Status: ACTIVE | Noted: 2025-01-27

## 2025-01-27 PROCEDURE — 1159F MED LIST DOCD IN RCRD: CPT | Mod: CPTII,S$GLB,, | Performed by: INTERNAL MEDICINE

## 2025-01-27 PROCEDURE — 1111F DSCHRG MED/CURRENT MED MERGE: CPT | Mod: CPTII,S$GLB,, | Performed by: INTERNAL MEDICINE

## 2025-01-27 PROCEDURE — 99203 OFFICE O/P NEW LOW 30 MIN: CPT | Mod: S$GLB,,, | Performed by: INTERNAL MEDICINE

## 2025-01-27 PROCEDURE — 99999 PR PBB SHADOW E&M-EST. PATIENT-LVL IV: CPT | Mod: PBBFAC,,, | Performed by: INTERNAL MEDICINE

## 2025-01-27 PROCEDURE — 3074F SYST BP LT 130 MM HG: CPT | Mod: CPTII,S$GLB,, | Performed by: INTERNAL MEDICINE

## 2025-01-27 PROCEDURE — 3008F BODY MASS INDEX DOCD: CPT | Mod: CPTII,S$GLB,, | Performed by: INTERNAL MEDICINE

## 2025-01-27 PROCEDURE — 3078F DIAST BP <80 MM HG: CPT | Mod: CPTII,S$GLB,, | Performed by: INTERNAL MEDICINE

## 2025-01-27 NOTE — PROGRESS NOTES
Subjective:       Patient ID: German Rabago is a 39 y.o. male.    Chief Complaint: Hospital Follow Up and Diverticulitis    Hospital f/u diverticulitis----recurrent-------much better-------but still with some discomfort right lower abdomen.--------completed ab-----no fever-----------eating ok--------normal bowels ---no bleeding-      Past Medical History:   Diagnosis Date    Diverticular disease of large intestine without perforation or abscess      Past Surgical History:   Procedure Laterality Date    SMALL INTESTINE SURGERY  Can't remember    Long time ago at [facility] Long thought I had a hernia but it was a fatty deposit on small intestine     Family History   Problem Relation Name Age of Onset    Depression Mother Maryam Rabago      Social History     Socioeconomic History    Marital status: Single   Tobacco Use    Smoking status: Some Days     Current packs/day: 0.00     Average packs/day: 0.5 packs/day for 15.0 years (7.5 ttl pk-yrs)     Types: Cigarettes     Last attempt to quit: 1/15/2019     Years since quittin.0    Smokeless tobacco: Never    Tobacco comments:     Since 17   Substance and Sexual Activity    Alcohol use: Never    Drug use: Not Currently     Frequency: 7.0 times per week     Types: Marijuana     Comment: For mental health use    Sexual activity: Not Currently     Partners: Female     Social Drivers of Health     Financial Resource Strain: Low Risk  (2025)    Overall Financial Resource Strain (CARDIA)     Difficulty of Paying Living Expenses: Not hard at all   Food Insecurity: No Food Insecurity (2025)    Hunger Vital Sign     Worried About Running Out of Food in the Last Year: Never true     Ran Out of Food in the Last Year: Never true   Transportation Needs: No Transportation Needs (2025)    TRANSPORTATION NEEDS     Transportation : No   Physical Activity: Inactive (2025)    Exercise Vital Sign     Days of Exercise per Week: 0 days     Minutes of Exercise per Session:  0 min   Stress: No Stress Concern Present (1/14/2025)    [employer] - Occupational Stress Questionnaire     Feeling of Stress : Not at all   Housing Stability: Low Risk  (1/14/2025)    Housing Stability Vital Sign     Unable to Pay for Housing in the Last Year: No     Homeless in the Last Year: No     Review of Systems   Constitutional:  Negative for activity change, appetite change, chills, diaphoresis, fatigue, fever and unexpected weight change.   HENT:  Negative for drooling, ear discharge, ear pain, facial swelling, hearing loss, mouth sores, nosebleeds, postnasal drip, rhinorrhea, sinus pressure, sneezing, sore throat, tinnitus, trouble swallowing and voice change.    Eyes:  Negative for photophobia, redness and visual disturbance.   Respiratory:  Negative for apnea, cough, choking, chest tightness, shortness of breath and wheezing.    Cardiovascular:  Negative for chest pain, palpitations and leg swelling.   Gastrointestinal:  Positive for abdominal pain. Negative for abdominal distention, anal bleeding, blood in stool, constipation, diarrhea, nausea and vomiting.   Endocrine: Negative for cold intolerance, heat intolerance, polydipsia, polyphagia and polyuria.   Genitourinary:  Negative for difficulty urinating, dysuria, enuresis, flank pain, frequency, genital sores, hematuria and urgency.   Musculoskeletal:  Negative for arthralgias, back pain, gait problem, joint swelling, myalgias, neck pain and neck stiffness.   Skin:  Negative for color change, pallor, rash and wound.   Allergic/Immunologic: Negative for food allergies and immunocompromised state.   Neurological:  Negative for dizziness, tremors, seizures, syncope, facial asymmetry, speech difficulty, weakness, light-headedness, numbness and headaches.   Hematological:  Negative for adenopathy. Does not bruise/bleed easily.   Psychiatric/Behavioral:  Negative for agitation, behavioral problems, confusion, decreased  concentration, dysphoric mood, hallucinations, self-injury, sleep disturbance and suicidal ideas. The patient is not nervous/anxious and is not hyperactive.        Objective:      Physical Exam  Vitals and nursing note reviewed.   Constitutional:       General: He is not in acute distress.     Appearance: Normal appearance. He is well-developed. He is not diaphoretic.   HENT:      Head: Normocephalic and atraumatic.      Mouth/Throat:      Pharynx: No oropharyngeal exudate.   Eyes:      General: No scleral icterus.     Pupils: Pupils are equal, round, and reactive to light.   Neck:      Thyroid: No thyromegaly.      Vascular: No carotid bruit or JVD.      Trachea: No tracheal deviation.   Cardiovascular:      Rate and Rhythm: Normal rate and regular rhythm.      Heart sounds: Normal heart sounds.   Pulmonary:      Effort: Pulmonary effort is normal. No respiratory distress.      Breath sounds: Normal breath sounds. No wheezing or rales.   Chest:      Chest wall: No tenderness.   Abdominal:      General: Bowel sounds are normal. There is no distension.      Palpations: Abdomen is soft.      Tenderness: There is no abdominal tenderness. There is no guarding or rebound.   Musculoskeletal:         General: No tenderness. Normal range of motion.      Cervical back: Normal range of motion and neck supple.   Lymphadenopathy:      Cervical: No cervical adenopathy.   Skin:     General: Skin is warm and dry.      Coloration: Skin is not pale.      Findings: No erythema or rash.   Neurological:      Mental Status: He is alert and oriented to person, place, and time.      Cranial Nerves: No cranial nerve deficit.      Coordination: Coordination normal.   Psychiatric:         Behavior: Behavior normal.         Thought Content: Thought content normal.         Judgment: Judgment normal.         CMP  Sodium   Date Value Ref Range Status   01/16/2025 138 136 - 145 mmol/L Final     Potassium   Date Value Ref Range Status   01/16/2025  3.9 3.5 - 5.1 mmol/L Final     Chloride   Date Value Ref Range Status   01/16/2025 110 95 - 110 mmol/L Final     CO2   Date Value Ref Range Status   01/16/2025 17 (L) 23 - 29 mmol/L Final     Glucose   Date Value Ref Range Status   01/16/2025 77 70 - 110 mg/dL Final     BUN   Date Value Ref Range Status   01/16/2025 12 6 - 20 mg/dL Final     Creatinine   Date Value Ref Range Status   01/16/2025 0.8 0.5 - 1.4 mg/dL Final     Calcium   Date Value Ref Range Status   01/16/2025 8.4 (L) 8.7 - 10.5 mg/dL Final     Total Protein   Date Value Ref Range Status   01/16/2025 5.9 (L) 6.0 - 8.4 g/dL Final     Albumin   Date Value Ref Range Status   01/16/2025 2.9 (L) 3.5 - 5.2 g/dL Final     Total Bilirubin   Date Value Ref Range Status   01/16/2025 0.4 0.1 - 1.0 mg/dL Final     Comment:     For infants and newborns, interpretation of results should be based  on gestational age, weight and in agreement with clinical  observations.    Premature Infant recommended reference ranges:  Up to 24 hours.............<8.0 mg/dL  Up to 48 hours............<12.0 mg/dL  3-5 days..................<15.0 mg/dL  6-29 days.................<15.0 mg/dL       Alkaline Phosphatase   Date Value Ref Range Status   01/16/2025 53 40 - 150 U/L Final     AST   Date Value Ref Range Status   01/16/2025 11 10 - 40 U/L Final     ALT   Date Value Ref Range Status   01/16/2025 6 (L) 10 - 44 U/L Final     Anion Gap   Date Value Ref Range Status   01/16/2025 11 8 - 16 mmol/L Final     eGFR    Date Value Ref Range Status   06/25/2024 106 mL/min/1.73mSq Final     Comment:     In accordance with NKF-ASN Task Force recommendation, calculation based on the Chronic Kidney Disease Epidemiology Collaboration (CKD-EPI) equation without adjustment for race. eGFR adjusted for gender and age and calculated in ml/min/1.73mSquared. eGFR cannot be calculated if patient is under 18 years of age.     Reference Range:   >= 60 ml/min/1.73mSquared.     Lab Results  "  Component Value Date    WBC 9.47 01/16/2025    HGB 13.1 (L) 01/16/2025    HCT 39.8 (L) 01/16/2025    MCV 91 01/16/2025     01/16/2025     Lab Results   Component Value Date    CHOL 222 (H) 02/05/2021     Lab Results   Component Value Date    HDL 34 (L) 02/05/2021     Lab Results   Component Value Date    LDLCALC 141 (H) 02/05/2021     Lab Results   Component Value Date    TRIG 259 (H) 02/05/2021     No results found for: "CHOLHDL"  No results found for: "TSH"  No results found for: "LABA1C", "HGBA1C"  Assessment:       1. Diverticulitis    2. Hospital discharge follow-up        Plan:   Diverticulitis----------------treated---------symptoms improved--------------------f/u CRS-------------------------    Hospital discharge follow-up      As above----------------go to er for worsening symptoms-  "

## 2025-02-03 ENCOUNTER — OFFICE VISIT (OUTPATIENT)
Dept: SURGERY | Facility: CLINIC | Age: 40
End: 2025-02-03
Payer: COMMERCIAL

## 2025-02-03 VITALS
BODY MASS INDEX: 25.97 KG/M2 | SYSTOLIC BLOOD PRESSURE: 114 MMHG | WEIGHT: 185.5 LBS | TEMPERATURE: 99 F | HEIGHT: 71 IN | OXYGEN SATURATION: 97 % | HEART RATE: 83 BPM | DIASTOLIC BLOOD PRESSURE: 76 MMHG

## 2025-02-03 DIAGNOSIS — K57.30 DIVERTICULA OF COLON: ICD-10-CM

## 2025-02-03 DIAGNOSIS — K57.92 DIVERTICULITIS: Primary | ICD-10-CM

## 2025-02-03 PROCEDURE — 3078F DIAST BP <80 MM HG: CPT | Mod: CPTII,S$GLB,, | Performed by: COLON & RECTAL SURGERY

## 2025-02-03 PROCEDURE — 99214 OFFICE O/P EST MOD 30 MIN: CPT | Mod: S$GLB,,, | Performed by: COLON & RECTAL SURGERY

## 2025-02-03 PROCEDURE — 1111F DSCHRG MED/CURRENT MED MERGE: CPT | Mod: CPTII,S$GLB,, | Performed by: COLON & RECTAL SURGERY

## 2025-02-03 PROCEDURE — 3008F BODY MASS INDEX DOCD: CPT | Mod: CPTII,S$GLB,, | Performed by: COLON & RECTAL SURGERY

## 2025-02-03 PROCEDURE — 3074F SYST BP LT 130 MM HG: CPT | Mod: CPTII,S$GLB,, | Performed by: COLON & RECTAL SURGERY

## 2025-02-03 PROCEDURE — 99999 PR PBB SHADOW E&M-EST. PATIENT-LVL IV: CPT | Mod: PBBFAC,,, | Performed by: COLON & RECTAL SURGERY

## 2025-02-03 PROCEDURE — 1159F MED LIST DOCD IN RCRD: CPT | Mod: CPTII,S$GLB,, | Performed by: COLON & RECTAL SURGERY

## 2025-02-03 RX ORDER — METRONIDAZOLE 500 MG/100ML
500 INJECTION, SOLUTION INTRAVENOUS
Status: CANCELLED | OUTPATIENT
Start: 2025-02-03

## 2025-02-03 RX ORDER — ONDANSETRON HYDROCHLORIDE 2 MG/ML
4 INJECTION, SOLUTION INTRAVENOUS EVERY 6 HOURS PRN
Status: CANCELLED | OUTPATIENT
Start: 2025-02-03

## 2025-02-03 RX ORDER — SODIUM CHLORIDE, SODIUM LACTATE, POTASSIUM CHLORIDE, CALCIUM CHLORIDE 600; 310; 30; 20 MG/100ML; MG/100ML; MG/100ML; MG/100ML
INJECTION, SOLUTION INTRAVENOUS CONTINUOUS
Status: CANCELLED | OUTPATIENT
Start: 2025-02-03

## 2025-02-03 RX ORDER — NEOMYCIN SULFATE 500 MG/1
1000 TABLET ORAL 3 TIMES DAILY
Qty: 6 TABLET | Refills: 0 | Status: ON HOLD | OUTPATIENT
Start: 2025-02-03 | End: 2025-02-13

## 2025-02-03 RX ORDER — GABAPENTIN 400 MG/1
800 CAPSULE ORAL
Status: CANCELLED | OUTPATIENT
Start: 2025-02-03 | End: 2025-02-03

## 2025-02-03 RX ORDER — HEPARIN SODIUM 5000 [USP'U]/ML
5000 INJECTION, SOLUTION INTRAVENOUS; SUBCUTANEOUS
Status: CANCELLED | OUTPATIENT
Start: 2025-02-03 | End: 2025-02-04

## 2025-02-03 RX ORDER — ACETAMINOPHEN 500 MG
1000 TABLET ORAL ONCE
Status: CANCELLED | OUTPATIENT
Start: 2025-02-03 | End: 2025-02-03

## 2025-02-03 RX ORDER — METRONIDAZOLE 500 MG/1
500 TABLET ORAL 3 TIMES DAILY
Qty: 3 TABLET | Refills: 0 | Status: ON HOLD | OUTPATIENT
Start: 2025-02-03 | End: 2025-02-13

## 2025-02-03 RX ORDER — POLYETHYLENE GLYCOL 3350 17 G/17G
238 POWDER, FOR SOLUTION ORAL DAILY
Qty: 1 EACH | Refills: 0 | Status: ON HOLD | OUTPATIENT
Start: 2025-02-03 | End: 2025-02-13

## 2025-02-03 RX ORDER — CELECOXIB 100 MG/1
400 CAPSULE ORAL
Status: CANCELLED | OUTPATIENT
Start: 2025-02-03 | End: 2025-02-03

## 2025-02-03 NOTE — PROGRESS NOTES
History & Physical    SUBJECTIVE:     History of Present Illness:  Patient is a 39 y.o. male presents for hospital follow up after recent admission for diverticulitis.  Patient has a history of multiple episodes of diverticulitis with his initial episode occurring with perforation in May/June 2024.  He was hospitalized at OSH for around 6 days and there was some concern for fistulization to the adjacent small bowel.  He was treated nonoperatively with antibiotics.  He subsequently underwent a colonoscopy in August 2024 a [facility] that did not show any evidence of IBD or fistulization of the small bowel.  He then had a recurrent attack in December 2024 that has mostly located in the right lower quadrant and suprapubic region.  He began having fever and chills and was admitted to the hospital with recurrent diverticulitis and treated nonoperatively again.  He presents to discuss possible surgical intervention.  He denies any current fever, chills, nausea, vomiting.  Denies any family history of IBD or CRC.  He finished his course of p.o. antibiotics over 1 week ago.    Review of patient's allergies indicates:  No Known Allergies    Current Outpatient Medications   Medication Sig Dispense Refill    HYDROcodone-acetaminophen (NORCO) 5-325 mg per tablet Take 1 tablet by mouth every 4 (four) hours as needed for Pain. 12 tablet 0    ibuprofen (ADVIL,MOTRIN) 800 MG tablet Take 1 tablet (800 mg total) by mouth 3 (three) times daily as needed for Pain. 90 tablet 0    metroNIDAZOLE (FLAGYL) 500 MG tablet Take 1 tablet (500 mg total) by mouth 3 (three) times daily. Take initial dose@2pm, second dose@3pm and final dose@10pm day before surgery 3 tablet 0    neomycin (MYCIFRADIN) 500 mg Tab Take 2 tablets (1,000 mg total) by mouth 3 (three) times daily. Take 1st dose@2pm,take 2nd dose@3pm, take last dose@10pm day before surgery 6 tablet 0    polyethylene glycol (GLYCOLAX) 17 gram/dose powder Take 238 g by mouth once daily.  Mix with 2L of gatorade, drink all mixed solution starting@12pm day before surgery, finish by 10pm 1 each 0     No current facility-administered medications for this visit.       Past Medical History:   Diagnosis Date    Diverticular disease of large intestine without perforation or abscess      Past Surgical History:   Procedure Laterality Date    SMALL INTESTINE SURGERY  Can't remember    Long time ago at [facility] thought I had a hernia but it was a fatty deposit on small intestine     Family History   Problem Relation Name Age of Onset    Depression Mother Maryam Rabago      Social History     Tobacco Use    Smoking status: Some Days     Current packs/day: 0.00     Average packs/day: 0.5 packs/day for 15.0 years (7.5 ttl pk-yrs)     Types: Cigarettes     Last attempt to quit: 1/15/2019     Years since quittin.0    Smokeless tobacco: Never    Tobacco comments:     Since 17   Substance Use Topics    Alcohol use: Never    Drug use: Not Currently     Frequency: 7.0 times per week     Types: Marijuana     Comment: For mental health use        Review of Systems:  Review of Systems   Constitutional:  Negative for activity change, appetite change, chills, fatigue, fever and unexpected weight change.   HENT:  Negative for congestion, ear pain, sore throat and trouble swallowing.    Eyes:  Negative for pain, redness and itching.   Respiratory:  Negative for cough, shortness of breath and wheezing.    Cardiovascular:  Negative for chest pain, palpitations and leg swelling.   Gastrointestinal:  Negative for abdominal distention, anal bleeding, blood in stool, constipation, diarrhea, nausea, rectal pain and vomiting.   Endocrine: Negative for cold intolerance, heat intolerance and polyuria.   Genitourinary:  Negative for dysuria, flank pain, frequency and hematuria.   Musculoskeletal:  Negative for gait problem, joint swelling and neck pain.   Skin:  Negative for color change, rash and wound.   Allergic/Immunologic:  "Negative for environmental allergies and immunocompromised state.   Neurological:  Negative for dizziness, speech difficulty, weakness and numbness.   Psychiatric/Behavioral:  Negative for agitation, confusion and hallucinations.        OBJECTIVE:     Vital Signs (Most Recent)  Temp: 98.5 °F (36.9 °C) (02/03/25 1255)  Pulse: 83 (02/03/25 1255)  BP: 114/76 (02/03/25 1255)  SpO2: 97 % (02/03/25 1255)  5' 11" (1.803 m)  84.1 kg (185 lb 8.3 oz)     Physical Exam:  Physical Exam  Constitutional:       Appearance: He is well-developed.   HENT:      Head: Normocephalic and atraumatic.   Eyes:      Conjunctiva/sclera: Conjunctivae normal.   Neck:      Thyroid: No thyromegaly.   Cardiovascular:      Rate and Rhythm: Normal rate and regular rhythm.   Pulmonary:      Effort: Pulmonary effort is normal. No respiratory distress.   Abdominal:      General: There is no distension.      Palpations: Abdomen is soft. There is no mass.      Tenderness: There is no abdominal tenderness.      Comments: No previous incisions   Musculoskeletal:         General: No tenderness. Normal range of motion.      Cervical back: Normal range of motion.   Skin:     General: Skin is warm and dry.      Capillary Refill: Capillary refill takes less than 2 seconds.      Findings: No rash.   Neurological:      Mental Status: He is alert and oriented to person, place, and time.         Laboratory  Lab Results   Component Value Date    WBC 9.47 01/16/2025    HGB 13.1 (L) 01/16/2025    HCT 39.8 (L) 01/16/2025     01/16/2025    CHOL 222 (H) 02/05/2021    TRIG 259 (H) 02/05/2021    HDL 34 (L) 02/05/2021    ALT 6 (L) 01/16/2025    AST 11 01/16/2025     01/16/2025    K 3.9 01/16/2025     01/16/2025    CREATININE 0.8 01/16/2025    BUN 12 01/16/2025    CO2 17 (L) 01/16/2025    INR 1.0 06/23/2024       Diagnostic Results:  CT: Reviewed  Colonoscopy 2024: Reviewed    CT:  FINDINGS:  Heart: Normal in size. No pericardial effusion.     Lung Bases: " Mild bibasilar atelectasis/scarring.     Liver: Unremarkable.     Gallbladder: No calcified gallstones.     Bile Ducts: No evidence of dilated ducts.     Pancreas: No mass or peripancreatic fat stranding.     Spleen: Unremarkable.     Adrenals: Unremarkable.     Kidneys/ Ureters: Unremarkable.     Bladder: No evidence of wall thickening.     Reproductive organs: Unremarkable.     GI Tract/Mesentery: Multiple colonic diverticula.  In the pelvis between the sigmoid colon and multiple inflamed loops of small bowel there are numerous locules of extraluminal gas.  Mild associated free fluid.  No drainable fluid collection.  No evidence of bowel obstruction.  There is fatty infiltration throughout the wall of the distal ileum.  The appendix has a normal appearance.     Peritoneal Space: As above.     Retroperitoneum: No adenopathy.     Abdominal wall: Unremarkable.     Vasculature: Mild atherosclerotic disease. No aortic aneurysm.     Bones: No acute fracture.     Impression:     Findings probably representing focally perforated diverticulitis on a background of acute on chronic inflammatory small bowel disease.  Alternatively the inflamed small bowel may be totally secondary to the perforated colonic diverticulum.  Fat within the wall of the distal small bowel suggests a history of infectious and/or inflammatory distal ileitis.  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  No drainable fluid collection.     ASSESSMENT/PLAN:     38yo M with recurrent sigmoid diverticulitis    - Long discussion with the patient regarding his recurrent diverticulitis.  We discussed that he initially had perforated diverticulitis in his had recurrent symptoms that were worse the 2nd time.  This is impacting his ability to work and he is afraid of the next episode.  We discussed that given the possible fistulization to the small bowel as well as right lower quadrant pain, he is a candidate for elective resection of the sigmoid  colon.  After discussing the risks, benefits and alternatives, he is amenable to this plan.  - plan for robotic sigmoid colectomy this month  - All risks, benefits and alternatives fully explained to patient. Risks include, but are not limited to, bleeding, infection, anastomotic leak, damage to ureter, damage to other intra-abdominal organs such as colon, rectum, small bowel, stomach, liver, bladder, reproductive organs, sexual dysfunction, urinary dysfunction, postoperative abscess, conversion to open operation, perioperative MI, CVA and death.  All questions field and appropriately answered to patient's satisfaction.  Consent signed and placed on chart.  - mechanical and oral antibiotic bowel prep  - ERAS protocol  - RTC postop    Ignacio Aldana MD  Colon and Rectal Surgery  [facility]

## 2025-02-03 NOTE — H&P (VIEW-ONLY)
History & Physical    SUBJECTIVE:     History of Present Illness:  Patient is a 39 y.o. male presents for hospital follow up after recent admission for diverticulitis.  Patient has a history of multiple episodes of diverticulitis with his initial episode occurring with perforation in May/June 2024.  He was hospitalized at OSH for around 6 days and there was some concern for fistulization to the adjacent small bowel.  He was treated nonoperatively with antibiotics.  He subsequently underwent a colonoscopy in August 2024 a [facility] that did not show any evidence of IBD or fistulization of the small bowel.  He then had a recurrent attack in December 2024 that has mostly located in the right lower quadrant and suprapubic region.  He began having fever and chills and was admitted to the hospital with recurrent diverticulitis and treated nonoperatively again.  He presents to discuss possible surgical intervention.  He denies any current fever, chills, nausea, vomiting.  Denies any family history of IBD or CRC.  He finished his course of p.o. antibiotics over 1 week ago.    Review of patient's allergies indicates:  No Known Allergies    Current Outpatient Medications   Medication Sig Dispense Refill    HYDROcodone-acetaminophen (NORCO) 5-325 mg per tablet Take 1 tablet by mouth every 4 (four) hours as needed for Pain. 12 tablet 0    ibuprofen (ADVIL,MOTRIN) 800 MG tablet Take 1 tablet (800 mg total) by mouth 3 (three) times daily as needed for Pain. 90 tablet 0    metroNIDAZOLE (FLAGYL) 500 MG tablet Take 1 tablet (500 mg total) by mouth 3 (three) times daily. Take initial dose@2pm, second dose@3pm and final dose@10pm day before surgery 3 tablet 0    neomycin (MYCIFRADIN) 500 mg Tab Take 2 tablets (1,000 mg total) by mouth 3 (three) times daily. Take 1st dose@2pm,take 2nd dose@3pm, take last dose@10pm day before surgery 6 tablet 0    polyethylene glycol (GLYCOLAX) 17 gram/dose powder Take 238 g by mouth once daily.  Mix with 2L of gatorade, drink all mixed solution starting@12pm day before surgery, finish by 10pm 1 each 0     No current facility-administered medications for this visit.       Past Medical History:   Diagnosis Date    Diverticular disease of large intestine without perforation or abscess      Past Surgical History:   Procedure Laterality Date    SMALL INTESTINE SURGERY  Can't remember    Long time ago at [facility] thought I had a hernia but it was a fatty deposit on small intestine     Family History   Problem Relation Name Age of Onset    Depression Mother Maryam Rabago      Social History     Tobacco Use    Smoking status: Some Days     Current packs/day: 0.00     Average packs/day: 0.5 packs/day for 15.0 years (7.5 ttl pk-yrs)     Types: Cigarettes     Last attempt to quit: 1/15/2019     Years since quittin.0    Smokeless tobacco: Never    Tobacco comments:     Since 17   Substance Use Topics    Alcohol use: Never    Drug use: Not Currently     Frequency: 7.0 times per week     Types: Marijuana     Comment: For mental health use        Review of Systems:  Review of Systems   Constitutional:  Negative for activity change, appetite change, chills, fatigue, fever and unexpected weight change.   HENT:  Negative for congestion, ear pain, sore throat and trouble swallowing.    Eyes:  Negative for pain, redness and itching.   Respiratory:  Negative for cough, shortness of breath and wheezing.    Cardiovascular:  Negative for chest pain, palpitations and leg swelling.   Gastrointestinal:  Negative for abdominal distention, anal bleeding, blood in stool, constipation, diarrhea, nausea, rectal pain and vomiting.   Endocrine: Negative for cold intolerance, heat intolerance and polyuria.   Genitourinary:  Negative for dysuria, flank pain, frequency and hematuria.   Musculoskeletal:  Negative for gait problem, joint swelling and neck pain.   Skin:  Negative for color change, rash and wound.   Allergic/Immunologic:  "Negative for environmental allergies and immunocompromised state.   Neurological:  Negative for dizziness, speech difficulty, weakness and numbness.   Psychiatric/Behavioral:  Negative for agitation, confusion and hallucinations.        OBJECTIVE:     Vital Signs (Most Recent)  Temp: 98.5 °F (36.9 °C) (02/03/25 1255)  Pulse: 83 (02/03/25 1255)  BP: 114/76 (02/03/25 1255)  SpO2: 97 % (02/03/25 1255)  5' 11" (1.803 m)  84.1 kg (185 lb 8.3 oz)     Physical Exam:  Physical Exam  Constitutional:       Appearance: He is well-developed.   HENT:      Head: Normocephalic and atraumatic.   Eyes:      Conjunctiva/sclera: Conjunctivae normal.   Neck:      Thyroid: No thyromegaly.   Cardiovascular:      Rate and Rhythm: Normal rate and regular rhythm.   Pulmonary:      Effort: Pulmonary effort is normal. No respiratory distress.   Abdominal:      General: There is no distension.      Palpations: Abdomen is soft. There is no mass.      Tenderness: There is no abdominal tenderness.      Comments: No previous incisions   Musculoskeletal:         General: No tenderness. Normal range of motion.      Cervical back: Normal range of motion.   Skin:     General: Skin is warm and dry.      Capillary Refill: Capillary refill takes less than 2 seconds.      Findings: No rash.   Neurological:      Mental Status: He is alert and oriented to person, place, and time.         Laboratory  Lab Results   Component Value Date    WBC 9.47 01/16/2025    HGB 13.1 (L) 01/16/2025    HCT 39.8 (L) 01/16/2025     01/16/2025    CHOL 222 (H) 02/05/2021    TRIG 259 (H) 02/05/2021    HDL 34 (L) 02/05/2021    ALT 6 (L) 01/16/2025    AST 11 01/16/2025     01/16/2025    K 3.9 01/16/2025     01/16/2025    CREATININE 0.8 01/16/2025    BUN 12 01/16/2025    CO2 17 (L) 01/16/2025    INR 1.0 06/23/2024       Diagnostic Results:  CT: Reviewed  Colonoscopy 2024: Reviewed    CT:  FINDINGS:  Heart: Normal in size. No pericardial effusion.     Lung Bases: " Mild bibasilar atelectasis/scarring.     Liver: Unremarkable.     Gallbladder: No calcified gallstones.     Bile Ducts: No evidence of dilated ducts.     Pancreas: No mass or peripancreatic fat stranding.     Spleen: Unremarkable.     Adrenals: Unremarkable.     Kidneys/ Ureters: Unremarkable.     Bladder: No evidence of wall thickening.     Reproductive organs: Unremarkable.     GI Tract/Mesentery: Multiple colonic diverticula.  In the pelvis between the sigmoid colon and multiple inflamed loops of small bowel there are numerous locules of extraluminal gas.  Mild associated free fluid.  No drainable fluid collection.  No evidence of bowel obstruction.  There is fatty infiltration throughout the wall of the distal ileum.  The appendix has a normal appearance.     Peritoneal Space: As above.     Retroperitoneum: No adenopathy.     Abdominal wall: Unremarkable.     Vasculature: Mild atherosclerotic disease. No aortic aneurysm.     Bones: No acute fracture.     Impression:     Findings probably representing focally perforated diverticulitis on a background of acute on chronic inflammatory small bowel disease.  Alternatively the inflamed small bowel may be totally secondary to the perforated colonic diverticulum.  Fat within the wall of the distal small bowel suggests a history of infectious and/or inflammatory distal ileitis.  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  No drainable fluid collection.     ASSESSMENT/PLAN:     40yo M with recurrent sigmoid diverticulitis    - Long discussion with the patient regarding his recurrent diverticulitis.  We discussed that he initially had perforated diverticulitis in his had recurrent symptoms that were worse the 2nd time.  This is impacting his ability to work and he is afraid of the next episode.  We discussed that given the possible fistulization to the small bowel as well as right lower quadrant pain, he is a candidate for elective resection of the sigmoid  colon.  After discussing the risks, benefits and alternatives, he is amenable to this plan.  - plan for robotic sigmoid colectomy this month  - All risks, benefits and alternatives fully explained to patient. Risks include, but are not limited to, bleeding, infection, anastomotic leak, damage to ureter, damage to other intra-abdominal organs such as colon, rectum, small bowel, stomach, liver, bladder, reproductive organs, sexual dysfunction, urinary dysfunction, postoperative abscess, conversion to open operation, perioperative MI, CVA and death.  All questions field and appropriately answered to patient's satisfaction.  Consent signed and placed on chart.  - mechanical and oral antibiotic bowel prep  - ERAS protocol  - RTC postop    Ignacio Aldana MD  Colon and Rectal Surgery  [facility]

## 2025-02-05 ENCOUNTER — TELEPHONE (OUTPATIENT)
Dept: SURGERY | Facility: CLINIC | Age: 40
End: 2025-02-05
Payer: COMMERCIAL

## 2025-02-05 NOTE — TELEPHONE ENCOUNTER
Called pt back per message. Pt needed fax number for FMLA paperwork to be sent over for surgery on 2/13. Return to work date will be 3/3 with restrictions for full 6 weeks. Fax number given to pt.     ----- Message from Surjit sent at 2/5/2025  9:14 AM CST -----  .Type: Patient Call Back      Who called:    patient  What is the request in detail:  patient states he needs paperwork filled for employer for fmla      Can the clinic reply by [facility]?         Would the patient rather a call back or a response via [facility]?    call  Best call back number:    [phone]

## 2025-02-06 ENCOUNTER — PATIENT MESSAGE (OUTPATIENT)
Dept: PREADMISSION TESTING | Facility: HOSPITAL | Age: 40
End: 2025-02-06
Payer: COMMERCIAL

## 2025-02-07 ENCOUNTER — TELEPHONE (OUTPATIENT)
Dept: PREADMISSION TESTING | Facility: HOSPITAL | Age: 40
End: 2025-02-07
Payer: COMMERCIAL

## 2025-02-07 ENCOUNTER — ANESTHESIA EVENT (OUTPATIENT)
Dept: SURGERY | Facility: HOSPITAL | Age: 40
End: 2025-02-07
Payer: COMMERCIAL

## 2025-02-07 NOTE — TELEPHONE ENCOUNTER
Pre op instructions reviewed with Pt: Spoke about the following; verbalized understanding.    Procedure Date: 2/13/25  Arrival Time:  We will call you after 2pm the day before your procedure with your arrival time.    Address:   [facility] (Off [facility], 2nd Building on the left)  [address] [place] [address] [place]. [id number]  >>>Please enter through front entrance Lobby of 1st floor to Registration desk<<<    !!!INSTRUCTIONS IMPORTANT!!!  NO food or tobacco products after midnight the night before surgery! You may have clear liquids up to 3 hrs before your arrival to the Hospital!!!  Clear liquids include Gatorade, water, soda, black coffee or tea (no milk or creamer), and clear juices.  Clear liquids do NOT include anything with pulp or food particles (Chicken broth, ice cream, yogurt, Jello, etc.)    May have light breakfast day before surgery, but ONLY CLEAR LIQUIDS starting at 12 Noon. NO SOLID FOOD AFTER STARTING BOWEL PREP!  Take antibiotics day before surgery. Start bowel prep day before surgery at 12pm, finish by 10pm. Nothing after Midnight!      metronidazole------- 500 mg Oral 3 times daily, Take initial dose@2pm, second dose@3pm and final dose@10pm day before surgery    neomycin sulfate -1,000 mg Oral 3 times daily, Take initial dose@2pm, second dose@3pm and final dose@10pm day before surgery    polyethylene glycol 3350 238 g Oral Daily, Mix with 2L of Gatorade, drink all mixed solution starting@12pm day before surgery, finish by 10pm         *Take Only these medications with a small sip of water morning of surgery:  NONE    ____  NO powder, lotions, deodorants or creams on body.  ____  Remove all jewelry, piercings or foreign objects before arrival and leave at home.  ____  Dentures, Hearing Aids and Contact Lens will need to be removed prior to the start of surgery.  ____  Please bring photo ID and insurance information to hospital (Leave Valuables at Home)  ____  If going home the same  day, arrange for a ride home. You will not be able to             drive for 24 hours after anesthesia.  ____  Wear clean, loose fitting clothing. Allow for dressings, bandages.  ____  Avoid Alcoholic beverages 3 days prior to surgery, as it can thin the blood.     *Diabetic/ Prediabetic Patients: !!!If you take diabetic or weight loss medication, Do NOT take morning of surgery unless instructed by Doctor!!!  Metformin to be stopped 24 hrs prior to surgery.   Ozempic/ Mounjaro/ Wegovy/ Trulicity/ Semaglutide injections or weight loss medication to be stopped 7 days prior to surgery.  Long Acting Insulin Instructions: Hold the night before surgery.    Vitamins/supplements/ OTC Aspirin/NSAID products: Stop 7 days prior to surgery!    Weight Loss Injections/medications: Stop 7 days prior to surgery!      Bathing Instructions:    -Do not shave your face or body the day before or the day of surgery!   -Shower your body with anti-bacterial Soap (Dial, Lever 2000, or Hibiclens) the night before surgery and the morning of surgery.               -Do not use Hibiclens on your face or genitals.   -Rinse & dry your body thoroughly. Apply clean clothes after shower.    [facility] [place] Vis[facility]Ride [facility]:  Only 2 adults allowed in pre op/recovery area during your procedure. You MUST HAVE A RIDE HOME from a responsible adult that you know and trust. Medical Transport, Uber or Lyft can ONLY be used if patient has a responsible adult to accompany them during ride home.         *Signs and symptoms of Infection Before or After Surgery:               !!!If you experience any fever, chills, nausea/ vomiting, foul odor/ excessive drainage from surgical site, flu-like symptoms, new wounds or cuts, PLEASE CALL THE SURGEON OFFICE at [phone] or SEND MESSAGE THROUGH [facility] PORTAL!!!       *If you are running late or have questions the morning of surgery, please call the Hospital Surgery Dept @ [phone].     *Billing  questions:  [phone]  [phone]       Thank you,  -[facility] Pre Admit Dept.  [phone] or [phone]  M-F 7:30 am-4:00 pm (Closed Major Holidays)

## 2025-02-12 ENCOUNTER — TELEPHONE (OUTPATIENT)
Dept: SURGERY | Facility: CLINIC | Age: 40
End: 2025-02-12
Payer: COMMERCIAL

## 2025-02-12 NOTE — TELEPHONE ENCOUNTER
Called and spoke with patient about the following:     Please arrive to [facility] ([facility]) at 6:00am on 2/13/25 for your scheduled procedure.  Address: [address] [place] [address] [place]. [address] (2nd Building on left, 1st Floor Lobby)    !!!NO FOOD after midnight! You may have clear liquids up to 3 hrs before your arrival to the Hospital!!!  Clear liquids include Gatorade, water, soda, black coffee or tea (no milk or creamer), and clear juices.  Clear liquids do NOT include anything with pulp or food particles (Chicken broth, ice cream, yogurt, Jello, etc.)

## 2025-02-13 ENCOUNTER — ANESTHESIA (OUTPATIENT)
Dept: SURGERY | Facility: HOSPITAL | Age: 40
End: 2025-02-13
Payer: COMMERCIAL

## 2025-02-13 ENCOUNTER — HOSPITAL ENCOUNTER (INPATIENT)
Facility: HOSPITAL | Age: 40
LOS: 2 days | Discharge: HOME | DRG: 331 | End: 2025-02-15
Attending: COLON & RECTAL SURGERY | Admitting: COLON & RECTAL SURGERY
Payer: COMMERCIAL

## 2025-02-13 DIAGNOSIS — K57.92 DIVERTICULITIS: ICD-10-CM

## 2025-02-13 PROCEDURE — 25000003 PHARM REV CODE 250: Performed by: NURSE ANESTHETIST, CERTIFIED REGISTERED

## 2025-02-13 PROCEDURE — 88307 TISSUE EXAM BY PATHOLOGIST: CPT | Performed by: STUDENT IN AN ORGANIZED HEALTH CARE EDUCATION/TRAINING PROGRAM

## 2025-02-13 PROCEDURE — 63600175 PHARM REV CODE 636 W HCPCS

## 2025-02-13 PROCEDURE — 0DN84ZZ RELEASE SMALL INTESTINE, PERCUTANEOUS ENDOSCOPIC APPROACH: ICD-10-PCS | Performed by: COLON & RECTAL SURGERY

## 2025-02-13 PROCEDURE — 37000008 HC ANESTHESIA 1ST 15 MINUTES: Performed by: COLON & RECTAL SURGERY

## 2025-02-13 PROCEDURE — 0DTN4ZZ RESECTION OF SIGMOID COLON, PERCUTANEOUS ENDOSCOPIC APPROACH: ICD-10-PCS | Performed by: COLON & RECTAL SURGERY

## 2025-02-13 PROCEDURE — 8E0W4CZ ROBOTIC ASSISTED PROCEDURE OF TRUNK REGION, PERCUTANEOUS ENDOSCOPIC APPROACH: ICD-10-PCS | Performed by: COLON & RECTAL SURGERY

## 2025-02-13 PROCEDURE — 44207 L COLECTOMY/COLOPROCTOSTOMY: CPT | Mod: ,,, | Performed by: COLON & RECTAL SURGERY

## 2025-02-13 PROCEDURE — 37000009 HC ANESTHESIA EA ADD 15 MINS: Performed by: COLON & RECTAL SURGERY

## 2025-02-13 PROCEDURE — 63600175 PHARM REV CODE 636 W HCPCS: Performed by: STUDENT IN AN ORGANIZED HEALTH CARE EDUCATION/TRAINING PROGRAM

## 2025-02-13 PROCEDURE — 11000001 HC ACUTE MED/SURG PRIVATE ROOM

## 2025-02-13 PROCEDURE — 94799 UNLISTED PULMONARY SVC/PX: CPT

## 2025-02-13 PROCEDURE — 25000003 PHARM REV CODE 250: Performed by: COLON & RECTAL SURGERY

## 2025-02-13 PROCEDURE — 44207 L COLECTOMY/COLOPROCTOSTOMY: CPT | Mod: AS,,,

## 2025-02-13 PROCEDURE — 0DJD8ZZ INSPECTION OF LOWER INTESTINAL TRACT, VIA NATURAL OR ARTIFICIAL OPENING ENDOSCOPIC: ICD-10-PCS | Performed by: COLON & RECTAL SURGERY

## 2025-02-13 PROCEDURE — 71000039 HC RECOVERY, EACH ADD'L HOUR: Performed by: COLON & RECTAL SURGERY

## 2025-02-13 PROCEDURE — C1729 CATH, DRAINAGE: HCPCS | Performed by: COLON & RECTAL SURGERY

## 2025-02-13 PROCEDURE — 71000033 HC RECOVERY, INTIAL HOUR: Performed by: COLON & RECTAL SURGERY

## 2025-02-13 PROCEDURE — 63600175 PHARM REV CODE 636 W HCPCS: Performed by: COLON & RECTAL SURGERY

## 2025-02-13 PROCEDURE — 27201423 OPTIME MED/SURG SUP & DEVICES STERILE SUPPLY: Performed by: COLON & RECTAL SURGERY

## 2025-02-13 PROCEDURE — 25000003 PHARM REV CODE 250: Performed by: STUDENT IN AN ORGANIZED HEALTH CARE EDUCATION/TRAINING PROGRAM

## 2025-02-13 PROCEDURE — 88305 TISSUE EXAM BY PATHOLOGIST: CPT | Mod: 26,,, | Performed by: STUDENT IN AN ORGANIZED HEALTH CARE EDUCATION/TRAINING PROGRAM

## 2025-02-13 PROCEDURE — 36000713 HC OR TIME LEV V EA ADD 15 MIN: Performed by: COLON & RECTAL SURGERY

## 2025-02-13 PROCEDURE — 63600175 PHARM REV CODE 636 W HCPCS: Mod: JZ,TB | Performed by: COLON & RECTAL SURGERY

## 2025-02-13 PROCEDURE — 25000003 PHARM REV CODE 250

## 2025-02-13 PROCEDURE — 88307 TISSUE EXAM BY PATHOLOGIST: CPT | Mod: 26,,, | Performed by: STUDENT IN AN ORGANIZED HEALTH CARE EDUCATION/TRAINING PROGRAM

## 2025-02-13 PROCEDURE — 99900035 HC TECH TIME PER 15 MIN (STAT)

## 2025-02-13 PROCEDURE — 0DNW4ZZ RELEASE PERITONEUM, PERCUTANEOUS ENDOSCOPIC APPROACH: ICD-10-PCS | Performed by: COLON & RECTAL SURGERY

## 2025-02-13 PROCEDURE — 36000712 HC OR TIME LEV V 1ST 15 MIN: Performed by: COLON & RECTAL SURGERY

## 2025-02-13 PROCEDURE — 88305 TISSUE EXAM BY PATHOLOGIST: CPT | Performed by: STUDENT IN AN ORGANIZED HEALTH CARE EDUCATION/TRAINING PROGRAM

## 2025-02-13 RX ORDER — ACETAMINOPHEN 10 MG/ML
1000 INJECTION, SOLUTION INTRAVENOUS EVERY 8 HOURS
Status: COMPLETED | OUTPATIENT
Start: 2025-02-13 | End: 2025-02-14

## 2025-02-13 RX ORDER — ONDANSETRON HYDROCHLORIDE 2 MG/ML
4 INJECTION, SOLUTION INTRAVENOUS EVERY 6 HOURS PRN
Status: DISCONTINUED | OUTPATIENT
Start: 2025-02-13 | End: 2025-02-13 | Stop reason: HOSPADM

## 2025-02-13 RX ORDER — GABAPENTIN 400 MG/1
800 CAPSULE ORAL
Status: COMPLETED | OUTPATIENT
Start: 2025-02-13 | End: 2025-02-13

## 2025-02-13 RX ORDER — MIDAZOLAM HYDROCHLORIDE 1 MG/ML
INJECTION INTRAMUSCULAR; INTRAVENOUS
Status: DISCONTINUED | OUTPATIENT
Start: 2025-02-13 | End: 2025-02-13

## 2025-02-13 RX ORDER — GABAPENTIN 300 MG/1
300 CAPSULE ORAL 3 TIMES DAILY
Status: DISCONTINUED | OUTPATIENT
Start: 2025-02-13 | End: 2025-02-15 | Stop reason: HOSPADM

## 2025-02-13 RX ORDER — FENTANYL CITRATE 50 UG/ML
INJECTION, SOLUTION INTRAMUSCULAR; INTRAVENOUS
Status: DISCONTINUED | OUTPATIENT
Start: 2025-02-13 | End: 2025-02-13

## 2025-02-13 RX ORDER — ONDANSETRON HYDROCHLORIDE 2 MG/ML
4 INJECTION, SOLUTION INTRAVENOUS DAILY PRN
Status: DISCONTINUED | OUTPATIENT
Start: 2025-02-13 | End: 2025-02-13 | Stop reason: HOSPADM

## 2025-02-13 RX ORDER — METRONIDAZOLE 500 MG/100ML
500 INJECTION, SOLUTION INTRAVENOUS
Status: COMPLETED | OUTPATIENT
Start: 2025-02-13 | End: 2025-02-13

## 2025-02-13 RX ORDER — OXYCODONE HYDROCHLORIDE 5 MG/1
10 TABLET ORAL EVERY 4 HOURS PRN
Status: DISCONTINUED | OUTPATIENT
Start: 2025-02-13 | End: 2025-02-15 | Stop reason: HOSPADM

## 2025-02-13 RX ORDER — ONDANSETRON HYDROCHLORIDE 2 MG/ML
4 INJECTION, SOLUTION INTRAVENOUS ONCE AS NEEDED
Status: DISCONTINUED | OUTPATIENT
Start: 2025-02-13 | End: 2025-02-13 | Stop reason: HOSPADM

## 2025-02-13 RX ORDER — HEPARIN SODIUM 5000 [USP'U]/ML
5000 INJECTION, SOLUTION INTRAVENOUS; SUBCUTANEOUS
Status: COMPLETED | OUTPATIENT
Start: 2025-02-13 | End: 2025-02-13

## 2025-02-13 RX ORDER — LABETALOL HYDROCHLORIDE 5 MG/ML
5 INJECTION, SOLUTION INTRAVENOUS ONCE
Status: COMPLETED | OUTPATIENT
Start: 2025-02-13 | End: 2025-02-13

## 2025-02-13 RX ORDER — CEFTRIAXONE 1 G/1
2 INJECTION, POWDER, FOR SOLUTION INTRAMUSCULAR; INTRAVENOUS
Status: COMPLETED | OUTPATIENT
Start: 2025-02-13 | End: 2025-02-13

## 2025-02-13 RX ORDER — ACETAMINOPHEN 500 MG
1000 TABLET ORAL ONCE
Status: COMPLETED | OUTPATIENT
Start: 2025-02-13 | End: 2025-02-13

## 2025-02-13 RX ORDER — OXYCODONE HYDROCHLORIDE 5 MG/1
5 TABLET ORAL EVERY 4 HOURS PRN
Status: DISCONTINUED | OUTPATIENT
Start: 2025-02-13 | End: 2025-02-15 | Stop reason: HOSPADM

## 2025-02-13 RX ORDER — AMOXICILLIN 250 MG
1 CAPSULE ORAL 2 TIMES DAILY
Status: DISCONTINUED | OUTPATIENT
Start: 2025-02-13 | End: 2025-02-15 | Stop reason: HOSPADM

## 2025-02-13 RX ORDER — FENTANYL CITRATE 50 UG/ML
25 INJECTION, SOLUTION INTRAMUSCULAR; INTRAVENOUS EVERY 5 MIN PRN
Status: DISCONTINUED | OUTPATIENT
Start: 2025-02-13 | End: 2025-02-13 | Stop reason: HOSPADM

## 2025-02-13 RX ORDER — DEXAMETHASONE SODIUM PHOSPHATE 4 MG/ML
INJECTION, SOLUTION INTRA-ARTICULAR; INTRALESIONAL; INTRAMUSCULAR; INTRAVENOUS; SOFT TISSUE
Status: DISCONTINUED | OUTPATIENT
Start: 2025-02-13 | End: 2025-02-13

## 2025-02-13 RX ORDER — OXYCODONE AND ACETAMINOPHEN 5; 325 MG/1; MG/1
1 TABLET ORAL
Status: DISCONTINUED | OUTPATIENT
Start: 2025-02-13 | End: 2025-02-13 | Stop reason: HOSPADM

## 2025-02-13 RX ORDER — ENOXAPARIN SODIUM 100 MG/ML
40 INJECTION SUBCUTANEOUS EVERY 24 HOURS
Status: DISCONTINUED | OUTPATIENT
Start: 2025-02-14 | End: 2025-02-15 | Stop reason: HOSPADM

## 2025-02-13 RX ORDER — ROCURONIUM BROMIDE 10 MG/ML
INJECTION, SOLUTION INTRAVENOUS
Status: DISCONTINUED | OUTPATIENT
Start: 2025-02-13 | End: 2025-02-13

## 2025-02-13 RX ORDER — SODIUM CHLORIDE, SODIUM LACTATE, POTASSIUM CHLORIDE, CALCIUM CHLORIDE 600; 310; 30; 20 MG/100ML; MG/100ML; MG/100ML; MG/100ML
INJECTION, SOLUTION INTRAVENOUS CONTINUOUS
Status: DISCONTINUED | OUTPATIENT
Start: 2025-02-13 | End: 2025-02-14

## 2025-02-13 RX ORDER — BUPIVACAINE HYDROCHLORIDE 2.5 MG/ML
INJECTION, SOLUTION EPIDURAL; INFILTRATION; INTRACAUDAL; PERINEURAL
Status: DISCONTINUED | OUTPATIENT
Start: 2025-02-13 | End: 2025-02-13 | Stop reason: HOSPADM

## 2025-02-13 RX ORDER — DIPHENHYDRAMINE HYDROCHLORIDE 50 MG/ML
12.5 INJECTION, SOLUTION INTRAMUSCULAR; INTRAVENOUS EVERY 6 HOURS PRN
Status: DISCONTINUED | OUTPATIENT
Start: 2025-02-13 | End: 2025-02-15 | Stop reason: HOSPADM

## 2025-02-13 RX ORDER — MUPIROCIN 20 MG/G
OINTMENT TOPICAL 2 TIMES DAILY
Status: DISCONTINUED | OUTPATIENT
Start: 2025-02-13 | End: 2025-02-15 | Stop reason: HOSPADM

## 2025-02-13 RX ORDER — MORPHINE SULFATE 2 MG/ML
2 INJECTION, SOLUTION INTRAMUSCULAR; INTRAVENOUS
Status: DISCONTINUED | OUTPATIENT
Start: 2025-02-13 | End: 2025-02-15 | Stop reason: HOSPADM

## 2025-02-13 RX ORDER — INDOCYANINE GREEN AND WATER 25 MG
KIT INJECTION
Status: DISCONTINUED | OUTPATIENT
Start: 2025-02-13 | End: 2025-02-13

## 2025-02-13 RX ORDER — LIDOCAINE HYDROCHLORIDE 20 MG/ML
INJECTION INTRAVENOUS
Status: DISCONTINUED | OUTPATIENT
Start: 2025-02-13 | End: 2025-02-13

## 2025-02-13 RX ORDER — ONDANSETRON HYDROCHLORIDE 2 MG/ML
4 INJECTION, SOLUTION INTRAVENOUS EVERY 6 HOURS PRN
Status: DISCONTINUED | OUTPATIENT
Start: 2025-02-13 | End: 2025-02-15 | Stop reason: HOSPADM

## 2025-02-13 RX ORDER — KETAMINE HCL IN 0.9 % NACL 50 MG/5 ML
SYRINGE (ML) INTRAVENOUS
Status: DISCONTINUED | OUTPATIENT
Start: 2025-02-13 | End: 2025-02-13

## 2025-02-13 RX ORDER — HYDROMORPHONE HYDROCHLORIDE 2 MG/ML
0.2 INJECTION, SOLUTION INTRAMUSCULAR; INTRAVENOUS; SUBCUTANEOUS EVERY 5 MIN PRN
Status: DISCONTINUED | OUTPATIENT
Start: 2025-02-13 | End: 2025-02-13 | Stop reason: HOSPADM

## 2025-02-13 RX ORDER — SODIUM CHLORIDE, SODIUM LACTATE, POTASSIUM CHLORIDE, CALCIUM CHLORIDE 600; 310; 30; 20 MG/100ML; MG/100ML; MG/100ML; MG/100ML
INJECTION, SOLUTION INTRAVENOUS CONTINUOUS
Status: DISCONTINUED | OUTPATIENT
Start: 2025-02-13 | End: 2025-02-13

## 2025-02-13 RX ORDER — CHLORHEXIDINE GLUCONATE ORAL RINSE 1.2 MG/ML
10 SOLUTION DENTAL 2 TIMES DAILY
Status: DISCONTINUED | OUTPATIENT
Start: 2025-02-13 | End: 2025-02-15 | Stop reason: HOSPADM

## 2025-02-13 RX ORDER — CELECOXIB 100 MG/1
400 CAPSULE ORAL
Status: COMPLETED | OUTPATIENT
Start: 2025-02-13 | End: 2025-02-13

## 2025-02-13 RX ORDER — ONDANSETRON HYDROCHLORIDE 2 MG/ML
INJECTION, SOLUTION INTRAVENOUS
Status: DISCONTINUED | OUTPATIENT
Start: 2025-02-13 | End: 2025-02-13

## 2025-02-13 RX ORDER — PROPOFOL 10 MG/ML
VIAL (ML) INTRAVENOUS
Status: DISCONTINUED | OUTPATIENT
Start: 2025-02-13 | End: 2025-02-13

## 2025-02-13 RX ORDER — BUPIVACAINE 13.3 MG/ML
INJECTION, SUSPENSION, LIPOSOMAL INFILTRATION
Status: DISCONTINUED | OUTPATIENT
Start: 2025-02-13 | End: 2025-02-13 | Stop reason: HOSPADM

## 2025-02-13 RX ORDER — HYDROMORPHONE HYDROCHLORIDE 2 MG/ML
INJECTION, SOLUTION INTRAMUSCULAR; INTRAVENOUS; SUBCUTANEOUS
Status: DISCONTINUED | OUTPATIENT
Start: 2025-02-13 | End: 2025-02-13

## 2025-02-13 RX ADMIN — LIDOCAINE HYDROCHLORIDE 100 MG: 20 INJECTION INTRAVENOUS at 08:02

## 2025-02-13 RX ADMIN — HEPARIN SODIUM 5000 UNITS: 5000 INJECTION INTRAVENOUS; SUBCUTANEOUS at 07:02

## 2025-02-13 RX ADMIN — OXYCODONE HYDROCHLORIDE 5 MG: 5 TABLET ORAL at 07:02

## 2025-02-13 RX ADMIN — HYDROMORPHONE HYDROCHLORIDE 0.5 MG: 2 INJECTION INTRAMUSCULAR; INTRAVENOUS; SUBCUTANEOUS at 10:02

## 2025-02-13 RX ADMIN — ROCURONIUM BROMIDE 30 MG: 10 SOLUTION INTRAVENOUS at 09:02

## 2025-02-13 RX ADMIN — SODIUM CHLORIDE, SODIUM LACTATE, POTASSIUM CHLORIDE, AND CALCIUM CHLORIDE: .6; .31; .03; .02 INJECTION, SOLUTION INTRAVENOUS at 08:02

## 2025-02-13 RX ADMIN — ROCURONIUM BROMIDE 20 MG: 10 SOLUTION INTRAVENOUS at 10:02

## 2025-02-13 RX ADMIN — FENTANYL CITRATE 25 MCG: 50 INJECTION, SOLUTION INTRAMUSCULAR; INTRAVENOUS at 09:02

## 2025-02-13 RX ADMIN — Medication 25 MG: at 08:02

## 2025-02-13 RX ADMIN — CEFTRIAXONE SODIUM 2 G: 1 INJECTION, POWDER, FOR SOLUTION INTRAMUSCULAR; INTRAVENOUS at 08:02

## 2025-02-13 RX ADMIN — Medication 15 MG: at 08:02

## 2025-02-13 RX ADMIN — GABAPENTIN 300 MG: 300 CAPSULE ORAL at 03:02

## 2025-02-13 RX ADMIN — GABAPENTIN 800 MG: 400 CAPSULE ORAL at 07:02

## 2025-02-13 RX ADMIN — INDOCYANINE GREEN AND WATER 2.5 MG: KIT at 11:02

## 2025-02-13 RX ADMIN — GABAPENTIN 300 MG: 300 CAPSULE ORAL at 09:02

## 2025-02-13 RX ADMIN — FENTANYL CITRATE 25 MCG: 50 INJECTION, SOLUTION INTRAMUSCULAR; INTRAVENOUS at 08:02

## 2025-02-13 RX ADMIN — ACETAMINOPHEN 1000 MG: 500 TABLET ORAL at 07:02

## 2025-02-13 RX ADMIN — ROCURONIUM BROMIDE 25 MG: 10 SOLUTION INTRAVENOUS at 08:02

## 2025-02-13 RX ADMIN — ROCURONIUM BROMIDE 20 MG: 10 SOLUTION INTRAVENOUS at 11:02

## 2025-02-13 RX ADMIN — CHLORHEXIDINE GLUCONATE 0.12% ORAL RINSE 10 ML: 1.2 LIQUID ORAL at 09:02

## 2025-02-13 RX ADMIN — MUPIROCIN: 20 OINTMENT TOPICAL at 09:02

## 2025-02-13 RX ADMIN — MIDAZOLAM HYDROCHLORIDE 2 MG: 1 INJECTION, SOLUTION INTRAMUSCULAR; INTRAVENOUS at 08:02

## 2025-02-13 RX ADMIN — ACETAMINOPHEN 1000 MG: 10 INJECTION INTRAVENOUS at 10:02

## 2025-02-13 RX ADMIN — METRONIDAZOLE 500 MG: 5 INJECTION, SOLUTION INTRAVENOUS at 08:02

## 2025-02-13 RX ADMIN — ROCURONIUM BROMIDE 20 MG: 10 SOLUTION INTRAVENOUS at 08:02

## 2025-02-13 RX ADMIN — PROPOFOL 150 MG: 10 INJECTION, EMULSION INTRAVENOUS at 08:02

## 2025-02-13 RX ADMIN — Medication 10 MG: at 10:02

## 2025-02-13 RX ADMIN — FENTANYL CITRATE 50 MCG: 50 INJECTION, SOLUTION INTRAMUSCULAR; INTRAVENOUS at 08:02

## 2025-02-13 RX ADMIN — ONDANSETRON 4 MG: 2 INJECTION INTRAMUSCULAR; INTRAVENOUS at 08:02

## 2025-02-13 RX ADMIN — SODIUM CHLORIDE, SODIUM LACTATE, POTASSIUM CHLORIDE, AND CALCIUM CHLORIDE: 600; 310; 30; 20 INJECTION, SOLUTION INTRAVENOUS at 02:02

## 2025-02-13 RX ADMIN — SENNOSIDES AND DOCUSATE SODIUM 1 TABLET: 50; 8.6 TABLET ORAL at 09:02

## 2025-02-13 RX ADMIN — ACETAMINOPHEN 1000 MG: 10 INJECTION INTRAVENOUS at 03:02

## 2025-02-13 RX ADMIN — CELECOXIB 400 MG: 100 CAPSULE ORAL at 07:02

## 2025-02-13 RX ADMIN — ROCURONIUM BROMIDE 5 MG: 10 SOLUTION INTRAVENOUS at 08:02

## 2025-02-13 RX ADMIN — DEXAMETHASONE SODIUM PHOSPHATE 8 MG: 4 INJECTION, SOLUTION INTRA-ARTICULAR; INTRALESIONAL; INTRAMUSCULAR; INTRAVENOUS; SOFT TISSUE at 08:02

## 2025-02-13 RX ADMIN — OXYCODONE HYDROCHLORIDE 10 MG: 5 TABLET ORAL at 11:02

## 2025-02-13 RX ADMIN — LABETALOL HYDROCHLORIDE 5 MG: 5 INJECTION INTRAVENOUS at 01:02

## 2025-02-13 RX ADMIN — ROCURONIUM BROMIDE 20 MG: 10 SOLUTION INTRAVENOUS at 09:02

## 2025-02-13 NOTE — NURSING TRANSFER
Nursing Transfer Note      2/13/2025   2:30 PM    Nurse giving handoff: Yola  Nurse receiving handoff: Heather    Reason patient is being transferred: Post-op    Transfer From: PACU    Transfer via stretcher    Transfer with none    Transported by Yola    Transfer Vital Signs:  Blood Pressure:143/95  Heart Rate:84  O2:96  Temperature:98.2  Respirations:16      Order for Tele Monitor? No    Additional Lines: Johnson Catheter    Medicines sent: none    Any special needs or follow-up needed: none    Patient belongings transferred with patient: No    Chart send with patient: Yes    Notified: spouse    Patient reassessed at: 1500 (date, time)  1  Upon arrival to floor: patient oriented to room, call bell in reach, and bed in lowest position

## 2025-02-13 NOTE — ANESTHESIA PREPROCEDURE EVALUATION
02/13/2025  German Rabago is a 39 y.o., male.      Pre-op Assessment    I have reviewed the Patient Summary Reports.     I have reviewed the Nursing Notes. I have reviewed the NPO Status.      Review of Systems  Anesthesia Hx:  No problems with previous Anesthesia                Hematology/Oncology:  Hematology Normal   Oncology Normal                                   Renal/:  Chronic Renal Disease                Hepatic/GI:  Hepatic/GI Normal                    Neurological:  Neurology Normal                                      Endocrine:  Endocrine Normal            Dermatological:  Skin Normal    Psych:  Psychiatric History                  Physical Exam  General: Well nourished, Cooperative, Alert and Oriented    Airway:  Mallampati: II / II  Mouth Opening: Normal  TM Distance: Normal  Tongue: Normal  Neck ROM: Normal ROM    Dental:  Intact      Patient Active Problem List   Diagnosis    Diverticula of colon    Generalized anxiety disorder    BRENDON (acute kidney injury)    Diverticulitis    Sepsis    Dyslipidemia         Anesthesia Plan  Type of Anesthesia, risks & benefits discussed:    Anesthesia Type: Gen ETT  Intra-op Monitoring Plan: Standard ASA Monitors  Post Op Pain Control Plan: multimodal analgesia  Induction:  IV  Airway Plan: Direct  Informed Consent: Informed consent signed with the Patient and all parties understand the risks and agree with anesthesia plan.  All questions answered.   ASA Score: 2  Day of Surgery Review of History & Physical: H&P Update referred to the surgeon/provider.I have interviewed and examined the patient. I have reviewed the patient's H&P dated: There are no significant changes. H&P completed by Anesthesiologist.    Ready For Surgery From Anesthesia Perspective.     .

## 2025-02-13 NOTE — OP NOTE
[facility] - Surgery ([facility])  Surgery Department  Operative Note    SUMMARY     Date of Procedure: 2/13/2025     Procedure:  Robotic sigmoidectomy  Lysis of adhesions  Transversus abdominis plane block  Flexible sigmoidoscopy    Surgeons and Role:     * Ignacio Aldana MD - Primary    Assistant: ALFRED Ferguson    Pre-Operative Diagnosis: Diverticulitis [K57.92]    Post-Operative Diagnosis: Post-Op Diagnosis Codes:     * Diverticulitis [K57.92]    Anesthesia: General    Technical Procedures Used:   Robotic sigmoidectomy  Lysis of adhesions  Transversus abdominis plane block  Flexible sigmoidoscopy    Indications for Procedure:  39-year-old male with previous sigmoid diverticulitis who presents for definitive surgical management    Findings of the Procedure:  Thickened sigmoid colon and mesentery with dense adhesions of the terminal ileum adherent to the phlegmon of tissue entering the pelvis amenable to lysis of adhesion with sigmoidectomy    Description of the Procedure:  Patient was brought to the operating room and placed supine on table.  General endotracheal anesthesia was then induced.  A Johnson catheter was sterilely placed.  The patient was then moved to lithotomy position.  A rectal washout was performed.  The abdomen was then prepped and draped usual sterile fashion.  A preoperative surgical time-out was performed confirming the correct patient, procedure and preop medications given.  A left upper quadrant 8 mm incision was made beneath the subcostal margin and the Veress needle inserted into abdominal cavity and confirmed good position with aspiration and saline drop test.  The abdomen was insufflated to a pressure 15 mm of mercury.  An 8 mm robotic trocar was inserted this defect using Optiview technique.  The camera was introduced and there was no signs of injury upon entry.  The abdomen was checked and there was no signs of metastatic disease.  A transversus abdominis plane block was then performed  using a mixture of 20 cc of Exparel, 30 cc of 0.25% bupivacaine plain and 10 cc of injectable saline.  12.5 cc of the mixture was injected into the transversus abdominis plane in all 4 quadrants for a total of 50 cc given for the block.  The remaining 10 cc was used at the end of the case for local infiltration.  A right lower quadrant 12 mm port was then placed under direct visualization.  Two additional 8 mm ports were then placed between the 2 original ports in a linear fashion.    The robot was then docked in usual fashion.  Attention was turned to the pelvis.  There was a phlegmon of tissue at the pelvic inlet that comprised the sigmoid colon as well as loops of small intestine.  A lysis of adhesion was performed  the phlegmon of tissue of the anterior abdominal wall and into the pelvis where there was normal-appearing upper rectum.  The phlegmon was also dissected off the lateral abdominal sidewalls to free the to have central visualization.  The loops of small intestine were then dissected off with care taken to not injure the small intestine as they were stuck to the colonic mesentery.  During the dissection process, there was a small amount of purulence that was expressed from the phlegmon of tissue indicating ongoing small abscess cavity.  Once they were fully free, they were evaluated and found to be without any significant defect.  There was external fibrotic tissue overlying 2 loops of the terminal ileum but this was around 5-10 cm away from the ileocecal valve.    Attention was then turned to the sigmoid colon.  Sigmoid colon appeared to be of normal caliber with the distal sigmoid colon as well as the proximal sigmoid colon. It was the middle sigmoid colon that was very thickened and with disease.  The sigmoid colon was then mobilized off its lateral attachments in the left ureter was then identified.  The sigmoid colon was then mobilized all the way to level of the descending sigmoid  junction.  The descending colon was then mobilized up the white line of Toldt and the retroperitoneal structures were swept down.  The descending colon was fully mobilized by  the retroperitoneal structures from the mesentery with care taken to protect the surrounding structures.  Once this had been done as far as possible, attention was turned back to the sigmoid colon.  The sigmoid colon was then mobilized fully by protecting the left ureter.  The descending sigmoid junction was then identified and a mesenteric window was made beneath this location.  The mesentery was then taken from this location all way towards the GISELA pedicle using the vessel sealer.  The GISELA pedicle was then taken after circumferential dissection and care taken to protect the left ureter with the vessel sealer.  Once that was taken, the stump was completely hemostatic.  The colon was then dissected all the way to the upper rectum.  A mesenteric window was made beneath the upper rectum which was of normal caliber.  The mesorectum was then divided using the vessel sealer.  Dissection was then carried back from this mesenteric window all the way to the previously taken mesentery of the sigmoid colon using the vessel sealer with care taken to protect the bilateral ureters.  Once this was completed, the rectum was then divided in the upper rectum using the vessel sealer in usual fashion.  The descending sigmoid junction was also divided using the vessel sealer in usual fashion.  The ends of the colon specimen were then closed using the vessel sealer.  Mesentery was then dissected off of the colonic specimen given the thickness of it to allow for transanal extraction.  Once this was completed, the open of the rectum was identified and a 15 Georgian bag was brought through the anus up to the opening of the rectum and the mesentery and the colon were then brought through the anus for natural orifice extraction.  They were passed off the field  for final pathology.    The 29 mm EEA stapler was then brought onto the field.  The anvil and spike were brought through the anus into the abdomen.  The anvil and spike were brought through the opening of the descending colon to a location a few cm away from the opening on the anti mesenteric border and brought through the wall of the bowel at this location.  The spike was then removed and brought out of the abdomen through the rectum under direct visualization.  The descending colon was then stapled closed as its opening using a robotic 60 mm blue load stapler.  The staple line was then oversewn with a 3-0 V lock suture in usual fashion.  The excised portion of the descending staple line was then removed from the abdomen through the anus and passed off the field with the specimen.  The rectum was then stapled closed at its opening in the upper rectum using 2 additional loads of the robotic 60 mm blue load stapler.  The staple line that was excised was then passed off the field for final pathology after extraction through the 12 port.  Ic green was given by anesthesia and firefly technology was used to confirm perfusion to both the descending colon to the staple line as well as to the rectal stump.    The 29 mm EEA stapler was then brought through the anus after 2 fingerbreadth dilation up to the rectal staple line.  The spike was brought out just anterior to the staple line.  The anvil and stapler were then  together in usual fashion to construct the side-to-end anastomosis.  Once the stapler was closed with care taken to not incorporate any surrounding structures it was confirmed with the mesentery was straightened it reached easily without tension.  The stapler was then fired in usual fashion.  Upon withdrawing the stapler there were 2 intact anastomotic donuts which were sent for final pathology.  The proximal descending colon was then manually clamped and the pelvis was filled with irrigation.  A  flexible sigmoidoscope was then inserted into the anus and the colon rectum were insufflated.  There was a negative air leak test.  The anastomosis was widely patent and hemostatic upon endoscopic viewing.  Once this was completed, the colon rectum were then desufflated.  Attention was turned back to the pelvis.  The anastomosis again was checked and found to be intact and without tension.    Attention was then turned back to the right lower quadrant.  The loops of terminal ileum which had been previously dissected off of the sigmoid colon were checked again and found to be without significant defect or openings or expressible drainage once they were compressed.  The omentum was then brought over top of the abdominal contents and placed within the pelvis and in the right lower quadrant over top of the small bowel which had been dissected off.  The robot was then de docked.    The right lower quadrant 12 mm port was then removed.  The fascia of the 12 mm port site was then closed using a Manuel-Aaliyah device with 0 Vicryl suture in a figure-of-eight fashion.  Once this was completed, the abdomen was checked and found to be hemostatic.  The rest of the ports were removed after desufflating the abdomen.  All sites were injected with local infiltration.  All sites were copiously irrigated made hemostatic.  All sites were closed with 4-0 Monocryl suture.  Skin glue was then applied.  The patient was then moved back in the supine position.  He was woken from general endotracheal anesthesia and taken to the postanesthesia care in stable condition.  He tolerated procedure well.  All sponge, needle and instrument counts were correct at the end of the case.    Significant Surgical Tasks Conducted by the Assistant(s), if Applicable: Assisted with all portions of the operation as it was required to help with the positioning, exposure and closure to complete the operation    Complications: No    Estimated Blood Loss (EBL):  20mL           Implants: * No implants in log *    Specimens:   Specimen (24h ago, onward)       Start     Ordered    02/13/25 1220  Specimen to Pathology, Surgery General Surgery  Once        Comments: Pre-op Diagnosis: Diverticulitis [K57.92]Procedure(s):DV5 ROBOTIC COLECTOMY, SIGMOIDBLOCK, TRANSVERSUS ABDOMINIS PLANESIGMOIDOSCOPY, FLEXIBLEDV5 ROBOTIC LYSIS, ADHESIONS Number of specimens: 2Name of specimens: 1. Sigmoid colon (perm) 2. Anastomotic donuts (perm)     References:    Click here for ordering Quick Tip   Question Answer Comment   Procedure Type: General Surgery    Specimen Class: Routine/Screening    Release to patient Immediate        02/13/25 1220                            Condition: Good    Disposition: PACU - hemodynamically stable.    Attestation: I performed the procedure.

## 2025-02-13 NOTE — TRANSFER OF CARE
"Anesthesia Transfer of Care Note    Patient: German Rabago    Procedure(s) Performed: Procedure(s) (LRB):  DV5 ROBOTIC COLECTOMY, SIGMOID (N/A)  BLOCK, TRANSVERSUS ABDOMINIS PLANE (N/A)  SIGMOIDOSCOPY, FLEXIBLE  DV5 ROBOTIC LYSIS, ADHESIONS (N/A)    Patient location: PACU    Anesthesia Type: general    Transport from OR: Transported from OR on room air with adequate spontaneous ventilation    Post pain: adequate analgesia    Post assessment: no apparent anesthetic complications and tolerated procedure well    Post vital signs: stable    Level of consciousness: responds to stimulation and sedated    Nausea/Vomiting: no nausea/vomiting    Complications: none    Transfer of care protocol was followedComments: Report given to PACU RN at bedside. Hand off tool used. RN given opportunity to ask questions or clarify concerns. No Concerns verbalized. RN was asked if ready to assume care of patient. RN verbally confirmed. Pt. left in stable condition. SV. Vital Signs Return to Near Baseline. No s/s of distress noted.       Last vitals: Visit Vitals  BP (!) 150/98 (BP Location: Right arm, Patient Position: Lying)   Pulse 85   Temp 36.5 °C (97.7 °F) (Temporal)   Resp 11   Ht 5' 11" (1.803 m)   Wt 80.7 kg (177 lb 14.6 oz)   SpO2 100%   BMI 24.81 kg/m²     "

## 2025-02-13 NOTE — ANESTHESIA PROCEDURE NOTES
Intubation    Date/Time: 2/13/2025 8:15 AM    Performed by: Juan Carlos Polo CRNA  Authorized by: Jennifer Reynoso MD    Intubation:     Induction:  Intravenous    Intubated:  Postinduction    Mask Ventilation:  Easy mask    Attempts:  1    Attempted By:  CRNA    Method of Intubation:  Direct    Blade:  Sandhya 3    Laryngeal View Grade: Grade I - full view of cords      Difficult Airway Encountered?: No      Complications:  None    Airway Device:  Oral endotracheal tube    Airway Device Size:  7.5    Style/Cuff Inflation:  Cuffed (inflated to minimal occlusive pressure)    Tube secured:  21    Secured at:  The lips    Placement Verified By:  Capnometry    Complicating Factors:  None    Findings Post-Intubation:  BS equal bilateral and atraumatic/condition of teeth unchanged  Notes:      Pt to OR via stretcher. All monitors applied. Smooth Induction. Eyelids secured with paper tape prior to intubation. 7.5 ETT placed via DL without issue. No s/s of damage to lips, mouth, teeth or gums noted. No s/s of aspiration noted. Pt. tolerated procedure well. ETT placement verified with ETCO2 waveform visualized, Bilateral Chest Rise and Fog in ETT noted.ETT secured with silk tape after placement of ETT confirmed. .

## 2025-02-14 PROBLEM — F17.200 TOBACCO DEPENDENCY: Status: ACTIVE | Noted: 2025-02-14

## 2025-02-14 LAB
ANION GAP SERPL CALC-SCNC: 11 MMOL/L (ref 8–16)
BUN SERPL-MCNC: 9 MG/DL (ref 6–20)
CALCIUM SERPL-MCNC: 8.3 MG/DL (ref 8.7–10.5)
CHLORIDE SERPL-SCNC: 108 MMOL/L (ref 95–110)
CO2 SERPL-SCNC: 18 MMOL/L (ref 23–29)
CREAT SERPL-MCNC: 0.8 MG/DL (ref 0.5–1.4)
ERYTHROCYTE [DISTWIDTH] IN BLOOD BY AUTOMATED COUNT: 14.2 % (ref 11.5–14.5)
EST. GFR  (NO RACE VARIABLE): >60 ML/MIN/1.73 M^2
GLUCOSE SERPL-MCNC: 100 MG/DL (ref 70–110)
HCT VFR BLD AUTO: 40.5 % (ref 40–54)
HGB BLD-MCNC: 13.3 G/DL (ref 14–18)
MAGNESIUM SERPL-MCNC: 1.9 MG/DL (ref 1.6–2.6)
MCH RBC QN AUTO: 29.7 PG (ref 27–31)
MCHC RBC AUTO-ENTMCNC: 32.8 G/DL (ref 32–36)
MCV RBC AUTO: 90 FL (ref 82–98)
PHOSPHATE SERPL-MCNC: 3.3 MG/DL (ref 2.7–4.5)
PLATELET # BLD AUTO: 240 K/UL (ref 150–450)
PMV BLD AUTO: 9.8 FL (ref 9.2–12.9)
POTASSIUM SERPL-SCNC: 3.8 MMOL/L (ref 3.5–5.1)
RBC # BLD AUTO: 4.48 M/UL (ref 4.6–6.2)
SODIUM SERPL-SCNC: 137 MMOL/L (ref 136–145)
WBC # BLD AUTO: 14.13 K/UL (ref 3.9–12.7)

## 2025-02-14 PROCEDURE — 63600175 PHARM REV CODE 636 W HCPCS

## 2025-02-14 PROCEDURE — 99024 POSTOP FOLLOW-UP VISIT: CPT | Mod: ,,,

## 2025-02-14 PROCEDURE — 25000003 PHARM REV CODE 250

## 2025-02-14 PROCEDURE — 99900035 HC TECH TIME PER 15 MIN (STAT)

## 2025-02-14 PROCEDURE — 80048 BASIC METABOLIC PNL TOTAL CA: CPT

## 2025-02-14 PROCEDURE — 94799 UNLISTED PULMONARY SVC/PX: CPT

## 2025-02-14 PROCEDURE — 83735 ASSAY OF MAGNESIUM: CPT

## 2025-02-14 PROCEDURE — 11000001 HC ACUTE MED/SURG PRIVATE ROOM

## 2025-02-14 PROCEDURE — 36415 COLL VENOUS BLD VENIPUNCTURE: CPT

## 2025-02-14 PROCEDURE — 85027 COMPLETE CBC AUTOMATED: CPT

## 2025-02-14 PROCEDURE — 84100 ASSAY OF PHOSPHORUS: CPT

## 2025-02-14 RX ORDER — ACETAMINOPHEN 325 MG/1
650 TABLET ORAL EVERY 6 HOURS
Status: DISCONTINUED | OUTPATIENT
Start: 2025-02-14 | End: 2025-02-15 | Stop reason: HOSPADM

## 2025-02-14 RX ORDER — SODIUM CHLORIDE 450 MG/100ML
INJECTION, SOLUTION INTRAVENOUS CONTINUOUS
Status: DISCONTINUED | OUTPATIENT
Start: 2025-02-14 | End: 2025-02-15 | Stop reason: HOSPADM

## 2025-02-14 RX ADMIN — SENNOSIDES AND DOCUSATE SODIUM 1 TABLET: 50; 8.6 TABLET ORAL at 09:02

## 2025-02-14 RX ADMIN — MUPIROCIN: 20 OINTMENT TOPICAL at 08:02

## 2025-02-14 RX ADMIN — MUPIROCIN: 20 OINTMENT TOPICAL at 09:02

## 2025-02-14 RX ADMIN — ACETAMINOPHEN 650 MG: 325 TABLET ORAL at 11:02

## 2025-02-14 RX ADMIN — ACETAMINOPHEN 650 MG: 325 TABLET ORAL at 05:02

## 2025-02-14 RX ADMIN — GABAPENTIN 300 MG: 300 CAPSULE ORAL at 09:02

## 2025-02-14 RX ADMIN — SODIUM CHLORIDE: 4.5 INJECTION, SOLUTION INTRAVENOUS at 12:02

## 2025-02-14 RX ADMIN — ENOXAPARIN SODIUM 40 MG: 40 INJECTION SUBCUTANEOUS at 05:02

## 2025-02-14 RX ADMIN — MORPHINE SULFATE 2 MG: 2 INJECTION, SOLUTION INTRAMUSCULAR; INTRAVENOUS at 08:02

## 2025-02-14 RX ADMIN — OXYCODONE HYDROCHLORIDE 5 MG: 5 TABLET ORAL at 05:02

## 2025-02-14 RX ADMIN — SENNOSIDES AND DOCUSATE SODIUM 1 TABLET: 50; 8.6 TABLET ORAL at 08:02

## 2025-02-14 RX ADMIN — OXYCODONE HYDROCHLORIDE 10 MG: 5 TABLET ORAL at 01:02

## 2025-02-14 RX ADMIN — GABAPENTIN 300 MG: 300 CAPSULE ORAL at 03:02

## 2025-02-14 RX ADMIN — CHLORHEXIDINE GLUCONATE 0.12% ORAL RINSE 10 ML: 1.2 LIQUID ORAL at 08:02

## 2025-02-14 RX ADMIN — OXYCODONE HYDROCHLORIDE 10 MG: 5 TABLET ORAL at 05:02

## 2025-02-14 RX ADMIN — OXYCODONE HYDROCHLORIDE 10 MG: 5 TABLET ORAL at 09:02

## 2025-02-14 RX ADMIN — ACETAMINOPHEN 1000 MG: 10 INJECTION INTRAVENOUS at 05:02

## 2025-02-14 RX ADMIN — GABAPENTIN 300 MG: 300 CAPSULE ORAL at 08:02

## 2025-02-14 RX ADMIN — CHLORHEXIDINE GLUCONATE 0.12% ORAL RINSE 10 ML: 1.2 LIQUID ORAL at 09:02

## 2025-02-14 NOTE — HOSPITAL COURSE
02/13/25: robotic sigmoidectomy     02/14/2025: POD 1. Tolerating regular diet, no n/v. Having non-bloody bowel movements, passing small amount of flatus. Pain well controlled     2/15/2025:  POD 2- tolerating regular diet.  Having non-bloody bowel movements.  Pain controlled. Appropriate for d/c home.

## 2025-02-14 NOTE — SUBJECTIVE & OBJECTIVE
Interval History:  Tolerating regular diet, no n/v. Having non-bloody bowel movements, passing small amount of flatus. Pain well controlled. Urinating without difficulty post bal removal    Medications:  Continuous Infusions:   0.45% NaCl   Intravenous Continuous 50 mL/hr at 02/14/25 1210 New Bag at 02/14/25 1210     Scheduled Meds:   acetaminophen  650 mg Oral Q6H    chlorhexidine  10 mL Mouth/Throat BID    enoxparin  40 mg Subcutaneous Daily    gabapentin  300 mg Oral TID    mupirocin   Nasal BID    senna-docusate 8.6-50 mg  1 tablet Oral BID     PRN Meds:  Current Facility-Administered Medications:     diphenhydrAMINE, 12.5 mg, Intravenous, Q6H PRN    morphine, 2 mg, Intravenous, Q3H PRN    ondansetron, 4 mg, Intravenous, Q6H PRN    oxyCODONE, 10 mg, Oral, Q4H PRN    oxyCODONE, 5 mg, Oral, Q4H PRN     Review of patient's allergies indicates:  No Known Allergies  Objective:     Vital Signs (Most Recent):  Temp: 97.6 °F (36.4 °C) (02/14/25 0833)  Pulse: 71 (02/14/25 0833)  Resp: 16 (02/14/25 0839)  BP: 122/77 (02/14/25 0833)  SpO2: (!) 94 % (02/14/25 0833) Vital Signs (24h Range):  Temp:  [97.6 °F (36.4 °C)-98.9 °F (37.2 °C)] 97.6 °F (36.4 °C)  Pulse:  [61-94] 71  Resp:  [11-18] 16  SpO2:  [94 %-100 %] 94 %  BP: (115-168)/() 122/77     Weight: 80.7 kg (177 lb 14.6 oz)  Body mass index is 24.81 kg/m².    Intake/Output - Last 3 Shifts         02/12 0700  02/13 0659 02/13 0700 02/14 0659 02/14 0700  02/15 0659    IV Piggyback  1000     Total Intake(mL/kg)  1000 (12.4)     Urine (mL/kg/hr)  1550 (0.8)     Total Output  1550     Net  -550            Urine Occurrence   1 x    Stool Occurrence   1 x             Physical Exam  Vitals and nursing note reviewed.   Constitutional:       General: He is not in acute distress.     Appearance: Normal appearance. He is well-developed and normal weight. He is not ill-appearing or toxic-appearing.   HENT:      Head: Normocephalic and atraumatic.      Right Ear: External ear  normal.      Left Ear: External ear normal.      Nose: Nose normal.   Cardiovascular:      Rate and Rhythm: Normal rate.   Pulmonary:      Effort: Pulmonary effort is normal. No respiratory distress.   Abdominal:      General: Abdomen is flat. There is no distension.      Palpations: Abdomen is soft.      Tenderness: There is no abdominal tenderness.      Comments: Soft, non-distended, appropriately TTP. Robotic port sites c/d/I without erythema or drainage.   Musculoskeletal:         General: Normal range of motion.      Cervical back: Normal range of motion and neck supple.   Skin:     General: Skin is warm and dry.   Neurological:      Mental Status: He is alert and oriented to person, place, and time.   Psychiatric:         Mood and Affect: Mood normal.         Behavior: Behavior normal.          Significant Labs:  I have reviewed all pertinent lab results within the past 24 hours.  CBC:   Recent Labs   Lab 02/14/25  0530   WBC 14.13*   RBC 4.48*   HGB 13.3*   HCT 40.5      MCV 90   MCH 29.7   MCHC 32.8     BMP:   Recent Labs   Lab 02/14/25  0530         K 3.8      CO2 18*   BUN 9   CREATININE 0.8   CALCIUM 8.3*   MG 1.9       Significant Diagnostics:  I have reviewed all pertinent imaging results/findings within the past 24 hours.

## 2025-02-14 NOTE — ASSESSMENT & PLAN NOTE
39 y.o. male with previous sigmoid diverticulitis now s/p robotic sigmoidectomy 02/13/25    - awaiting further return of bowel function - if continues to have further non-bloody bowel function, tolerates diet, ambulates more, possible dc soon   - prn pain control with scheduled tylenol  - encouraged OOB, ambulation  - regular diet  - incentive spirometry  - ppx lovenox

## 2025-02-14 NOTE — PLAN OF CARE
[facility] - Med Surg  Initial Discharge Assessment       Primary Care Provider: Gary Aguillon MD    Admission Diagnosis: Diverticulitis [K57.92]    Admission Date: 2/13/2025  Expected Discharge Date:     Transition of Care Barriers: None    Payor: BLUE CROSS BLUE SHIELD / Plan: BCBS ALL OUT OF STATE / Product Type: PPO /     Extended Emergency Contact Information  Primary Emergency Contact: rema edmond  Mobile Phone: [phone]  Relation: Significant other    Discharge Plan A: Home with family         [place] [employer] [facility] - [place] [id number] [facility]  [address] [facility] [address]  Phone: [phone] Fax: [phone]      Initial Assessment (most recent)       Adult Discharge Assessment - 02/14/25 1134          Discharge Assessment    Assessment Type Discharge Planning Assessment     Confirmed/corrected address, phone number and insurance Yes     Confirmed Demographics Correct on Facesheet     Source of Information patient     When was your last doctors appointment? 01/27/25     Communicated FARRUKH with patient/caregiver Date not available/Unable to determine     Reason For Admission Diverticulitis     People in Home alone     Facility Arrived From: Home     Do you expect to return to your current living situation? Yes     Do you have help at home or someone to help you manage your care at home? Yes     Who are your caregiver(s) and their phone number(s)? Significant other - Rema [phone]     Prior to hospitilization cognitive status: Alert/Oriented     Current cognitive status: Alert/Oriented     Walking or Climbing Stairs Difficulty no     Dressing/Bathing Difficulty no     Equipment Currently Used at Home none     Readmission within 30 days? No     Patient currently being followed by outpatient case management? No     Do you currently have service(s) that help you manage your care at home? No     Do you take prescription medications? Yes     Do you have prescription  coverage? Yes     Coverage BCBS     Do you have any problems affording any of your prescribed medications? No     Is the patient taking medications as prescribed? yes     Who is going to help you get home at discharge? Kari     How do you get to doctors appointments? car, drives self     Are you on dialysis? No     Do you take coumadin? No     Discharge Plan A Home with family     DME Needed Upon Discharge  none     Discharge Plan discussed with: Patient     Transition of Care Barriers None                      Anticipated DC Dispo: home with family    Prior level of function: independent    PCP: Gary Aguillon MD    CM met with patient to explain role and discuss discharge planning. Patient lives at home alone and is independent with ADLs. His significant other, Kari will provide transportation at the time of d/c.    Needs depend on hospital progress; CM following for needs.

## 2025-02-14 NOTE — ANESTHESIA POSTPROCEDURE EVALUATION
Anesthesia Post Evaluation    Patient: German Rabago    Procedure(s) Performed: Procedure(s) (LRB):  DV5 ROBOTIC COLECTOMY, SIGMOID (N/A)  BLOCK, TRANSVERSUS ABDOMINIS PLANE (N/A)  SIGMOIDOSCOPY, FLEXIBLE  DV5 ROBOTIC LYSIS, ADHESIONS (N/A)    Final Anesthesia Type: general      Patient location during evaluation: PACU  Patient participation: Yes- Able to Participate  Level of consciousness: awake and alert, oriented and awake  Post-procedure vital signs: reviewed and stable  Pain management: adequate  Airway patency: patent    PONV status at discharge: No PONV  Anesthetic complications: no      Cardiovascular status: blood pressure returned to baseline  Respiratory status: unassisted  Hydration status: euvolemic  Follow-up not needed.              Vitals Value Taken Time   /64 02/14/25 0519   Temp 36.9 °C (98.4 °F) 02/14/25 0519   Pulse 64 02/14/25 0519   Resp 18 02/14/25 0559   SpO2 97 % 02/14/25 0519         Event Time   Out of Recovery 14:24:00         Pain/Ryan Score: Pain Rating Prior to Med Admin: 7 (2/14/2025  5:59 AM)  Pain Rating Post Med Admin: 0 (2/13/2025  3:44 PM)  Ryan Score: 9 (2/13/2025  2:00 PM)

## 2025-02-14 NOTE — HPI
39-year-old male with previous sigmoid diverticulitis who presents for definitive surgical management

## 2025-02-14 NOTE — PROGRESS NOTES
[facility] Surg  Colorectal Surgery  Progress Note    Subjective:     History of Present Illness:  39-year-old male with previous sigmoid diverticulitis who presents for definitive surgical management     Post-Op Info:  Procedure(s) (LRB):  DV5 ROBOTIC COLECTOMY, SIGMOID (N/A)  BLOCK, TRANSVERSUS ABDOMINIS PLANE (N/A)  SIGMOIDOSCOPY, FLEXIBLE  DV5 ROBOTIC LYSIS, ADHESIONS (N/A)   1 Day Post-Op     Interval History:  Tolerating regular diet, no n/v. Having non-bloody bowel movements, passing small amount of flatus. Pain well controlled. Urinating without difficulty post bal removal    Medications:  Continuous Infusions:   0.45% NaCl   Intravenous Continuous 50 mL/hr at 02/14/25 1210 New Bag at 02/14/25 1210     Scheduled Meds:   acetaminophen  650 mg Oral Q6H    chlorhexidine  10 mL Mouth/Throat BID    enoxparin  40 mg Subcutaneous Daily    gabapentin  300 mg Oral TID    mupirocin   Nasal BID    senna-docusate 8.6-50 mg  1 tablet Oral BID     PRN Meds:  Current Facility-Administered Medications:     diphenhydrAMINE, 12.5 mg, Intravenous, Q6H PRN    morphine, 2 mg, Intravenous, Q3H PRN    ondansetron, 4 mg, Intravenous, Q6H PRN    oxyCODONE, 10 mg, Oral, Q4H PRN    oxyCODONE, 5 mg, Oral, Q4H PRN     Review of patient's allergies indicates:  No Known Allergies  Objective:     Vital Signs (Most Recent):  Temp: 97.6 °F (36.4 °C) (02/14/25 0833)  Pulse: 71 (02/14/25 0833)  Resp: 16 (02/14/25 0839)  BP: 122/77 (02/14/25 0833)  SpO2: (!) 94 % (02/14/25 0833) Vital Signs (24h Range):  Temp:  [97.6 °F (36.4 °C)-98.9 °F (37.2 °C)] 97.6 °F (36.4 °C)  Pulse:  [61-94] 71  Resp:  [11-18] 16  SpO2:  [94 %-100 %] 94 %  BP: (115-168)/() 122/77     Weight: 80.7 kg (177 lb 14.6 oz)  Body mass index is 24.81 kg/m².    Intake/Output - Last 3 Shifts         02/12 0700 02/13 0659 02/13 0700  02/14 0659 02/14 0700  02/15 0659    IV Piggyback  1000     Total Intake(mL/kg)  1000 (12.4)     Urine (mL/kg/hr)  1550 (0.8)     Total  Output  1550     Net  -550            Urine Occurrence   1 x    Stool Occurrence   1 x             Physical Exam  Vitals and nursing note reviewed.   Constitutional:       General: He is not in acute distress.     Appearance: Normal appearance. He is well-developed and normal weight. He is not ill-appearing or toxic-appearing.   HENT:      Head: Normocephalic and atraumatic.      Right Ear: External ear normal.      Left Ear: External ear normal.      Nose: Nose normal.   Cardiovascular:      Rate and Rhythm: Normal rate.   Pulmonary:      Effort: Pulmonary effort is normal. No respiratory distress.   Abdominal:      General: Abdomen is flat. There is no distension.      Palpations: Abdomen is soft.      Tenderness: There is no abdominal tenderness.      Comments: Soft, non-distended, appropriately TTP. Robotic port sites c/d/I without erythema or drainage.   Musculoskeletal:         General: Normal range of motion.      Cervical back: Normal range of motion and neck supple.   Skin:     General: Skin is warm and dry.   Neurological:      Mental Status: He is alert and oriented to person, place, and time.   Psychiatric:         Mood and Affect: Mood normal.         Behavior: Behavior normal.          Significant Labs:  I have reviewed all pertinent lab results within the past 24 hours.  CBC:   Recent Labs   Lab 02/14/25  0530   WBC 14.13*   RBC 4.48*   HGB 13.3*   HCT 40.5      MCV 90   MCH 29.7   MCHC 32.8     BMP:   Recent Labs   Lab 02/14/25  0530         K 3.8      CO2 18*   BUN 9   CREATININE 0.8   CALCIUM 8.3*   MG 1.9       Significant Diagnostics:  I have reviewed all pertinent imaging results/findings within the past 24 hours.  Assessment/Plan:     * Diverticulitis  39 y.o. male with previous sigmoid diverticulitis now s/p robotic sigmoidectomy 02/13/25    - awaiting further return of bowel function - if continues to have further non-bloody bowel function, tolerates diet, ambulates  more, possible dc soon   - prn pain control with scheduled tylenol  - encouraged OOB, ambulation  - regular diet  - incentive spirometry  - ppx lovenox          BILLY TrujilloC  Colorectal Surgery  [place] [facility]

## 2025-02-15 VITALS
TEMPERATURE: 98 F | HEART RATE: 63 BPM | RESPIRATION RATE: 18 BRPM | OXYGEN SATURATION: 98 % | BODY MASS INDEX: 24.91 KG/M2 | HEIGHT: 71 IN | SYSTOLIC BLOOD PRESSURE: 104 MMHG | DIASTOLIC BLOOD PRESSURE: 67 MMHG | WEIGHT: 177.94 LBS

## 2025-02-15 LAB
ANION GAP SERPL CALC-SCNC: 9 MMOL/L (ref 8–16)
BUN SERPL-MCNC: 9 MG/DL (ref 6–20)
CALCIUM SERPL-MCNC: 8.3 MG/DL (ref 8.7–10.5)
CHLORIDE SERPL-SCNC: 106 MMOL/L (ref 95–110)
CO2 SERPL-SCNC: 23 MMOL/L (ref 23–29)
CREAT SERPL-MCNC: 0.8 MG/DL (ref 0.5–1.4)
ERYTHROCYTE [DISTWIDTH] IN BLOOD BY AUTOMATED COUNT: 14.5 % (ref 11.5–14.5)
EST. GFR  (NO RACE VARIABLE): >60 ML/MIN/1.73 M^2
GLUCOSE SERPL-MCNC: 90 MG/DL (ref 70–110)
HCT VFR BLD AUTO: 40.7 % (ref 40–54)
HGB BLD-MCNC: 13.3 G/DL (ref 14–18)
MAGNESIUM SERPL-MCNC: 1.9 MG/DL (ref 1.6–2.6)
MCH RBC QN AUTO: 30.2 PG (ref 27–31)
MCHC RBC AUTO-ENTMCNC: 32.7 G/DL (ref 32–36)
MCV RBC AUTO: 93 FL (ref 82–98)
PHOSPHATE SERPL-MCNC: 2.6 MG/DL (ref 2.7–4.5)
PLATELET # BLD AUTO: 219 K/UL (ref 150–450)
PMV BLD AUTO: 10.3 FL (ref 9.2–12.9)
POTASSIUM SERPL-SCNC: 4 MMOL/L (ref 3.5–5.1)
RBC # BLD AUTO: 4.4 M/UL (ref 4.6–6.2)
SODIUM SERPL-SCNC: 138 MMOL/L (ref 136–145)
WBC # BLD AUTO: 9.74 K/UL (ref 3.9–12.7)

## 2025-02-15 PROCEDURE — 25000003 PHARM REV CODE 250

## 2025-02-15 PROCEDURE — 80048 BASIC METABOLIC PNL TOTAL CA: CPT

## 2025-02-15 PROCEDURE — 85027 COMPLETE CBC AUTOMATED: CPT

## 2025-02-15 PROCEDURE — 36415 COLL VENOUS BLD VENIPUNCTURE: CPT

## 2025-02-15 PROCEDURE — 83735 ASSAY OF MAGNESIUM: CPT

## 2025-02-15 PROCEDURE — 84100 ASSAY OF PHOSPHORUS: CPT

## 2025-02-15 RX ORDER — OXYCODONE HYDROCHLORIDE 5 MG/1
5 TABLET ORAL EVERY 4 HOURS PRN
Qty: 20 TABLET | Refills: 0 | Status: SHIPPED | OUTPATIENT
Start: 2025-02-15

## 2025-02-15 RX ADMIN — GABAPENTIN 300 MG: 300 CAPSULE ORAL at 09:02

## 2025-02-15 RX ADMIN — SODIUM CHLORIDE: 4.5 INJECTION, SOLUTION INTRAVENOUS at 08:02

## 2025-02-15 RX ADMIN — CHLORHEXIDINE GLUCONATE 0.12% ORAL RINSE 10 ML: 1.2 LIQUID ORAL at 09:02

## 2025-02-15 RX ADMIN — MUPIROCIN: 20 OINTMENT TOPICAL at 09:02

## 2025-02-15 RX ADMIN — OXYCODONE HYDROCHLORIDE 10 MG: 5 TABLET ORAL at 06:02

## 2025-02-15 NOTE — DISCHARGE SUMMARY
[facility] Surg  General Surgery  Discharge Summary      Patient Name: German Rabago  MRN: [id number]  Admission Date: 2/13/2025  Hospital Length of Stay: 2 days  Discharge Date and Time:  02/15/2025 10:40 AM  Attending Physician: Ignacio Aldana MD   Discharging Provider: Aria Rivas MD  Primary Care Provider: Gary Agiullon MD    HPI:   39-year-old male with previous sigmoid diverticulitis who presents for definitive surgical management     Procedure(s) (LRB):  DV5 ROBOTIC COLECTOMY, SIGMOID (N/A)  BLOCK, TRANSVERSUS ABDOMINIS PLANE (N/A)  SIGMOIDOSCOPY, FLEXIBLE  DV5 ROBOTIC LYSIS, ADHESIONS (N/A)      Indwelling Lines/Drains at time of discharge:   Lines/Drains/Airways       None                 Hospital Course: 02/13/25: robotic sigmoidectomy     02/14/2025: POD 1. Tolerating regular diet, no n/v. Having non-bloody bowel movements, passing small amount of flatus. Pain well controlled     2/15/2025:  POD 2- tolerating regular diet.  Having non-bloody bowel movements.  Pain controlled. Appropriate for d/c home.      Goals of Care Treatment Preferences:  Code Status: Full Code      Consults:     Significant Diagnostic Studies: Labs: CMP   Recent Labs   Lab 02/14/25  0530 02/15/25  0550    138   K 3.8 4.0    106   CO2 18* 23    90   BUN 9 9   CREATININE 0.8 0.8   CALCIUM 8.3* 8.3*   ANIONGAP 11 9   , CBC   Recent Labs   Lab 02/14/25  0530 02/15/25  0550   WBC 14.13* 9.74   HGB 13.3* 13.3*   HCT 40.5 40.7    219   , and All labs within the past 24 hours have been reviewed    Pending Diagnostic Studies:       Procedure Component Value Units Date/Time    Specimen to Pathology, Surgery General Surgery [[id number]] Collected: 02/13/25 1220    Order Status: Sent Lab Status: In process Updated: 02/13/25 1312    Specimen: Tissue           Final Active Diagnoses:    Diagnosis Date Noted POA    PRINCIPAL PROBLEM:  Diverticulitis [K57.92] 01/13/2025 Yes    Tobacco  dependency [F17.200] 02/14/2025 Yes      Problems Resolved During this Admission:      Discharged Condition: good    Disposition: Home or Self Care    Follow Up:   Follow-up Information       Ignacio Aldana MD Follow up in 2 week(s).    Specialty: Colon and Rectal Surgery  Contact information:  [address] [place] [address] DR Arianna MENJIVAR [id number]  [phone]                           Patient Instructions:      Diet Adult Regular     Notify your health care provider if you experience any of the following:  temperature >100.4     Notify your health care provider if you experience any of the following:  persistent nausea and vomiting or diarrhea     Notify your health care provider if you experience any of the following:  severe uncontrolled pain     Notify your health care provider if you experience any of the following:  redness, tenderness, or signs of infection (pain, swelling, redness, odor or green/yellow discharge around incision site)     No dressing needed     Reason for not Ordering Smoking Cessation Referral     Order Specific Question Answer Comments   Reason for not ordering: Not medically appropriate at this time      Reason for not Prescribing Nicotine Replacement     Order Specific Question Answer Comments   Reason for not Prescribing: Not medically appropriate at this time      Activity as tolerated     Medications:  Reconciled Home Medications:      Medication List        START taking these medications      oxyCODONE 5 MG immediate release tablet  Commonly known as: ROXICODONE  Take 1 tablet (5 mg total) by mouth every 4 (four) hours as needed for Pain.            CONTINUE taking these medications      ibuprofen 800 MG tablet  Commonly known as: ADVIL,MOTRIN  Take 1 tablet (800 mg total) by mouth 3 (three) times daily as needed for Pain.            STOP taking these medications      HYDROcodone-acetaminophen 5-325 mg per tablet  Commonly known as: NORCO            Time spent on the discharge of  patient: 15 minutes    Aria Rivas MD  General Surgery  [facility]

## 2025-02-15 NOTE — PLAN OF CARE
Pt being discharged Home in stable condition. IV removed, catheter intact, pt tolerated well. Discharge instructions given to pt, pt verbalized understanding. Pt left the floor walking.

## 2025-02-15 NOTE — PLAN OF CARE
Problem: Adult Inpatient Plan of Care  Goal: Plan of Care Review  Outcome: Adequate for Care Transition  Goal: Patient-Specific Goal (Individualized)  Outcome: Adequate for Care Transition  Goal: Absence of Hospital-Acquired Illness or Injury  Outcome: Adequate for Care Transition  Goal: Optimal Comfort and Wellbeing  Outcome: Adequate for Care Transition  Goal: Readiness for Transition of Care  Outcome: Adequate for Care Transition     Problem: Sepsis/Septic Shock  Goal: Optimal Coping  Outcome: Adequate for Care Transition  Goal: Absence of Bleeding  Outcome: Adequate for Care Transition  Goal: Blood Glucose Level Within Targeted Range  Outcome: Adequate for Care Transition  Goal: Absence of Infection Signs and Symptoms  Outcome: Adequate for Care Transition  Goal: Optimal Nutrition Intake  Outcome: Adequate for Care Transition     Problem: Acute Kidney Injury/Impairment  Goal: Fluid and Electrolyte Balance  Outcome: Adequate for Care Transition  Goal: Improved Oral Intake  Outcome: Adequate for Care Transition  Goal: Effective Renal Function  Outcome: Adequate for Care Transition     Problem: Infection  Goal: Absence of Infection Signs and Symptoms  Outcome: Adequate for Care Transition     Problem: Wound  Goal: Optimal Coping  Outcome: Adequate for Care Transition  Goal: Optimal Functional Ability  Outcome: Adequate for Care Transition  Goal: Absence of Infection Signs and Symptoms  Outcome: Adequate for Care Transition  Goal: Improved Oral Intake  Outcome: Adequate for Care Transition  Goal: Optimal Pain Control and Function  Outcome: Adequate for Care Transition  Goal: Skin Health and Integrity  Outcome: Adequate for Care Transition  Goal: Optimal Wound Healing  Outcome: Adequate for Care Transition     Problem: Fall Injury Risk  Goal: Absence of Fall and Fall-Related Injury  Outcome: Adequate for Care Transition

## 2025-02-20 LAB
FINAL PATHOLOGIC DIAGNOSIS: NORMAL
GROSS: NORMAL
Lab: NORMAL
MICROSCOPIC EXAM: NORMAL

## 2025-02-21 ENCOUNTER — TELEPHONE (OUTPATIENT)
Dept: SURGERY | Facility: CLINIC | Age: 40
End: 2025-02-21
Payer: COMMERCIAL

## 2025-02-21 NOTE — TELEPHONE ENCOUNTER
Called Barbara with [employer] and provided answers regarding pt's surgery from 2/13.     ----- Message from Summer sent at 2/21/2025  2:06 PM CST -----  Contact: Barbara/Nurse/[employer]  Barbara is needing a call back to confirm some surgery dates and surgery type and additional information. Please call back at [phone]

## 2025-03-03 ENCOUNTER — OFFICE VISIT (OUTPATIENT)
Dept: SURGERY | Facility: CLINIC | Age: 40
End: 2025-03-03
Payer: COMMERCIAL

## 2025-03-03 VITALS
DIASTOLIC BLOOD PRESSURE: 71 MMHG | SYSTOLIC BLOOD PRESSURE: 108 MMHG | BODY MASS INDEX: 25.73 KG/M2 | OXYGEN SATURATION: 99 % | HEART RATE: 75 BPM | TEMPERATURE: 99 F | WEIGHT: 183.75 LBS | HEIGHT: 71 IN

## 2025-03-03 DIAGNOSIS — K57.92 DIVERTICULITIS: Primary | ICD-10-CM

## 2025-03-03 PROCEDURE — 1159F MED LIST DOCD IN RCRD: CPT | Mod: CPTII,S$GLB,, | Performed by: COLON & RECTAL SURGERY

## 2025-03-03 PROCEDURE — 3074F SYST BP LT 130 MM HG: CPT | Mod: CPTII,S$GLB,, | Performed by: COLON & RECTAL SURGERY

## 2025-03-03 PROCEDURE — 3078F DIAST BP <80 MM HG: CPT | Mod: CPTII,S$GLB,, | Performed by: COLON & RECTAL SURGERY

## 2025-03-03 PROCEDURE — 99999 PR PBB SHADOW E&M-EST. PATIENT-LVL III: CPT | Mod: PBBFAC,,, | Performed by: COLON & RECTAL SURGERY

## 2025-03-03 PROCEDURE — 99024 POSTOP FOLLOW-UP VISIT: CPT | Mod: S$GLB,,, | Performed by: COLON & RECTAL SURGERY

## 2025-03-03 NOTE — PROGRESS NOTES
History & Physical    SUBJECTIVE:     History of Present Illness:  Patient is a 39 y.o. male presents for hospital follow up after recent admission for diverticulitis.  Patient has a history of multiple episodes of diverticulitis with his initial episode occurring with perforation in May/June 2024.  He was hospitalized at OSH for around 6 days and there was some concern for fistulization to the adjacent small bowel.  He was treated nonoperatively with antibiotics.  He subsequently underwent a colonoscopy in August 2024 a [facility] that did not show any evidence of IBD or fistulization of the small bowel.  He then had a recurrent attack in December 2024 that has mostly located in the right lower quadrant and suprapubic region.  He began having fever and chills and was admitted to the hospital with recurrent diverticulitis and treated nonoperatively again.  He presents to discuss possible surgical intervention.  He denies any current fever, chills, nausea, vomiting.  Denies any family history of IBD or CRC.  He finished his course of p.o. antibiotics over 1 week ago.    2/13/25: Robotic sigmoidectomy (path: benign)    Interval history:  Since last clinic visit, the patient underwent a robotic sigmoidectomy on 02/13/2025.  He had uneventful recovery in the hospital was discharged home.  Since discharge he has done very well.  He is tolerating regular diet and having normal bowel function without hematochezia or melena.  No longer has any deep abdominal pain but only some minor discomfort at his right lower quadrant port site.  Denies any fever, chills, nausea, vomiting.    Review of patient's allergies indicates:  No Known Allergies    Current Outpatient Medications   Medication Sig Dispense Refill    ibuprofen (ADVIL,MOTRIN) 800 MG tablet Take 1 tablet (800 mg total) by mouth 3 (three) times daily as needed for Pain. 90 tablet 0    oxyCODONE (ROXICODONE) 5 MG immediate release tablet Take 1 tablet (5 mg total) by  mouth every 4 (four) hours as needed for Pain. (Patient not taking: Reported on 3/3/2025) 20 tablet 0     No current facility-administered medications for this visit.       Past Medical History:   Diagnosis Date    Diverticular disease of large intestine without perforation or abscess      Past Surgical History:   Procedure Laterality Date    DV5 ROBOTIC COLECTOMY, SIGMOID N/A 2025    Procedure: DV5 ROBOTIC COLECTOMY, SIGMOID;  Surgeon: Ignacio Aldana MD;  Location: [facility] OR;  Service: General;  Laterality: N/A;    DV5 ROBOTIC LYSIS, ADHESIONS N/A 2025    Procedure: DV5 ROBOTIC LYSIS, ADHESIONS;  Surgeon: Ignacio Aldana MD;  Location: [facility] OR;  Service: General;  Laterality: N/A;    FLEXIBLE SIGMOIDOSCOPY  2025    Procedure: SIGMOIDOSCOPY, FLEXIBLE;  Surgeon: Ignacio Aldana MD;  Location: [facility] OR;  Service: General;;    INJECTION OF ANESTHETIC AGENT INTO TISSUE PLANE DEFINED BY TRANSVERSUS ABDOMINIS MUSCLE N/A 2025    Procedure: BLOCK, TRANSVERSUS ABDOMINIS PLANE;  Surgeon: Ignacio Aldana MD;  Location: [facility] OR;  Service: General;  Laterality: N/A;    SMALL INTESTINE SURGERY  Can't remember    Long time ago at [facility] Long thought I had a hernia but it was a fatty deposit on small intestine     Family History   Problem Relation Name Age of Onset    Depression Mother Maryam Rabago      Social History     Tobacco Use    Smoking status: Some Days     Current packs/day: 0.00     Average packs/day: 0.5 packs/day for 15.0 years (7.5 ttl pk-yrs)     Types: Cigarettes     Last attempt to quit: 1/15/2019     Years since quittin.1    Smokeless tobacco: Never    Tobacco comments:     Since 17   Substance Use Topics    Alcohol use: Not Currently    Drug use: Not Currently     Frequency: 7.0 times per week     Types: Marijuana     Comment: For mental health use        Review of Systems:  Review of Systems   Constitutional:  Negative for activity change, appetite change, chills, fatigue,  "fever and unexpected weight change.   HENT:  Negative for congestion, ear pain, sore throat and trouble swallowing.    Eyes:  Negative for pain, redness and itching.   Respiratory:  Negative for cough, shortness of breath and wheezing.    Cardiovascular:  Negative for chest pain, palpitations and leg swelling.   Gastrointestinal:  Negative for abdominal distention, anal bleeding, blood in stool, constipation, diarrhea, nausea, rectal pain and vomiting.   Endocrine: Negative for cold intolerance, heat intolerance and polyuria.   Genitourinary:  Negative for dysuria, flank pain, frequency and hematuria.   Musculoskeletal:  Negative for gait problem, joint swelling and neck pain.   Skin:  Negative for color change, rash and wound.   Allergic/Immunologic: Negative for environmental allergies and immunocompromised state.   Neurological:  Negative for dizziness, speech difficulty, weakness and numbness.   Psychiatric/Behavioral:  Negative for agitation, confusion and hallucinations.        OBJECTIVE:     Vital Signs (Most Recent)  Temp: 98.5 °F (36.9 °C) (03/03/25 1413)  Pulse: 75 (03/03/25 1413)  BP: 108/71 (03/03/25 1413)  SpO2: 99 % (03/03/25 1413)  5' 11" (1.803 m)  83.3 kg (183 lb 12.1 oz)     Physical Exam:  Physical Exam  Constitutional:       Appearance: He is well-developed.   HENT:      Head: Normocephalic and atraumatic.   Eyes:      Conjunctiva/sclera: Conjunctivae normal.   Neck:      Thyroid: No thyromegaly.   Cardiovascular:      Rate and Rhythm: Normal rate and regular rhythm.   Pulmonary:      Effort: Pulmonary effort is normal. No respiratory distress.   Abdominal:      General: There is no distension.      Palpations: Abdomen is soft. There is no mass.      Tenderness: There is no abdominal tenderness.      Comments: Port sites c/d/I without erythema or drainage   Musculoskeletal:         General: No tenderness. Normal range of motion.      Cervical back: Normal range of motion.   Skin:     General: " Skin is warm and dry.      Capillary Refill: Capillary refill takes less than 2 seconds.      Findings: No rash.   Neurological:      Mental Status: He is alert and oriented to person, place, and time.         Laboratory  Lab Results   Component Value Date    WBC 9.74 02/15/2025    HGB 13.3 (L) 02/15/2025    HCT 40.7 02/15/2025     02/15/2025    CHOL 222 (H) 02/05/2021    TRIG 259 (H) 02/05/2021    HDL 34 (L) 02/05/2021    ALT 6 (L) 01/16/2025    AST 11 01/16/2025     02/15/2025    K 4.0 02/15/2025     02/15/2025    CREATININE 0.8 02/15/2025    BUN 9 02/15/2025    CO2 23 02/15/2025    INR 1.0 06/23/2024       Diagnostic Results:  CT: Reviewed  Colonoscopy 2024: Reviewed    CT:  FINDINGS:  Heart: Normal in size. No pericardial effusion.     Lung Bases: Mild bibasilar atelectasis/scarring.     Liver: Unremarkable.     Gallbladder: No calcified gallstones.     Bile Ducts: No evidence of dilated ducts.     Pancreas: No mass or peripancreatic fat stranding.     Spleen: Unremarkable.     Adrenals: Unremarkable.     Kidneys/ Ureters: Unremarkable.     Bladder: No evidence of wall thickening.     Reproductive organs: Unremarkable.     GI Tract/Mesentery: Multiple colonic diverticula.  In the pelvis between the sigmoid colon and multiple inflamed loops of small bowel there are numerous locules of extraluminal gas.  Mild associated free fluid.  No drainable fluid collection.  No evidence of bowel obstruction.  There is fatty infiltration throughout the wall of the distal ileum.  The appendix has a normal appearance.     Peritoneal Space: As above.     Retroperitoneum: No adenopathy.     Abdominal wall: Unremarkable.     Vasculature: Mild atherosclerotic disease. No aortic aneurysm.     Bones: No acute fracture.     Impression:     Findings probably representing focally perforated diverticulitis on a background of acute on chronic inflammatory small bowel disease.  Alternatively the inflamed small bowel may  be totally secondary to the perforated colonic diverticulum.  Fat within the wall of the distal small bowel suggests a history of infectious and/or inflammatory distal ileitis.  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  No drainable fluid collection.       PATHOLOGY:    1. Sigmoid colon, sigmoidectomy:  - Marked active inflammation with abscess formation within pericolonic fat, in a background of extensive diverticulosis without definitive associated active diverticulitis (see comment)  - Colonic mucosa and submucosa without significant active inflammation or features of chronicity  - Extensive active serositis with dense serosal adhesions  - Multinucleated giant cells present in association with abscess and fat necrosis without well-developed epithelioid granulomas  - Benign prolapse-type polyp present  - Resection margins benign/ viable  - 13 benign lymph nodes  - Negative for dysplasia or malignancy    2. Colon, anastomotic rings, excision:  - Benign/ viable colonic mucosa with active serositis/ serosal adhesions       ASSESSMENT/PLAN:     40yo M with recurrent sigmoid diverticulitis and now s/p Robotic Sigmoidectomy in Feb 2025    - no further surgical intervention required at this time.  The patient had benign pathology in his recovered well.  - continue no lifting over 10 lb for a total of 6 weeks after surgery  - high-fiber diet encouraged with fiber supplementation  - RTC PRN    Ignacio Aldana MD  Colon and Rectal Surgery  [facility] [place]

## 2025-04-07 ENCOUNTER — PATIENT MESSAGE (OUTPATIENT)
Dept: SURGERY | Facility: HOSPITAL | Age: 40
End: 2025-04-07
Payer: COMMERCIAL

## 2025-04-15 ENCOUNTER — PATIENT MESSAGE (OUTPATIENT)
Dept: SURGERY | Facility: HOSPITAL | Age: 40
End: 2025-04-15
Payer: COMMERCIAL

## 2025-08-19 ENCOUNTER — OFFICE VISIT (OUTPATIENT)
Dept: FAMILY MEDICINE | Facility: CLINIC | Age: 40
End: 2025-08-19
Payer: COMMERCIAL

## 2025-08-19 VITALS
WEIGHT: 195.31 LBS | HEIGHT: 71 IN | TEMPERATURE: 97 F | BODY MASS INDEX: 27.34 KG/M2 | HEART RATE: 84 BPM | SYSTOLIC BLOOD PRESSURE: 122 MMHG | DIASTOLIC BLOOD PRESSURE: 82 MMHG | OXYGEN SATURATION: 98 %

## 2025-08-19 DIAGNOSIS — E78.5 DYSLIPIDEMIA: Primary | ICD-10-CM

## 2025-08-19 DIAGNOSIS — K57.30 DIVERTICULA OF COLON: ICD-10-CM

## 2025-08-19 DIAGNOSIS — K64.9 HEMORRHOIDS, UNSPECIFIED HEMORRHOID TYPE: ICD-10-CM

## 2025-08-19 DIAGNOSIS — Z90.49 S/P LAPAROSCOPIC-ASSISTED SIGMOIDECTOMY: ICD-10-CM

## 2025-08-19 DIAGNOSIS — Z00.00 ROUTINE ADULT HEALTH MAINTENANCE: ICD-10-CM

## 2025-08-19 PROCEDURE — 99999 PR PBB SHADOW E&M-EST. PATIENT-LVL IV: CPT | Mod: PBBFAC,,, | Performed by: INTERNAL MEDICINE

## 2025-08-19 PROCEDURE — 3008F BODY MASS INDEX DOCD: CPT | Mod: CPTII,S$GLB,, | Performed by: INTERNAL MEDICINE

## 2025-08-19 PROCEDURE — 99395 PREV VISIT EST AGE 18-39: CPT | Mod: S$GLB,,, | Performed by: INTERNAL MEDICINE

## 2025-08-19 PROCEDURE — 1159F MED LIST DOCD IN RCRD: CPT | Mod: CPTII,S$GLB,, | Performed by: INTERNAL MEDICINE

## 2025-08-19 PROCEDURE — 3074F SYST BP LT 130 MM HG: CPT | Mod: CPTII,S$GLB,, | Performed by: INTERNAL MEDICINE

## 2025-08-19 PROCEDURE — 3079F DIAST BP 80-89 MM HG: CPT | Mod: CPTII,S$GLB,, | Performed by: INTERNAL MEDICINE

## (undated) DEVICE — COVER TIP CURVED SCISSORS XI

## (undated) DEVICE — KIT ENDOKIT EGD/COLON/ERCP

## (undated) DEVICE — SPONGE COTTON TRAY 4X4IN

## (undated) DEVICE — COVER PROXIMA MAYO STAND

## (undated) DEVICE — ADHESIVE DERMABOND ADVANCED

## (undated) DEVICE — STAPLER SUREFORM 60 SPU

## (undated) DEVICE — SUT VICRYL CTD 2-0 GI 27 SH

## (undated) DEVICE — TRAY CATH 1-LYR URIMTR 16FR

## (undated) DEVICE — APPLICATOR CHLORAPREP ORN 26ML

## (undated) DEVICE — GLOVE SENSICARE PI GRN 8

## (undated) DEVICE — GLOVE SENSICARE PI GRN 6.5

## (undated) DEVICE — NDL PNEUMO INSUFFLATI 120MM

## (undated) DEVICE — SEALER VESSEL EXTEND

## (undated) DEVICE — PAD PINK TRENDELENBURG POS XL

## (undated) DEVICE — SET TUBE DAVINCI 5 HI FLOW INS

## (undated) DEVICE — KIT ENDO UNIV IRR SYSTEM

## (undated) DEVICE — DRAPE THREE-QTR REINF 53X77IN

## (undated) DEVICE — SUT SILK 0 SUTUPAK SA86H

## (undated) DEVICE — SUT VICRYL 0 27 CT-2

## (undated) DEVICE — SUT VICRYL PLUS 3-0 SH 18IN

## (undated) DEVICE — MANIFOLD 4 PORT

## (undated) DEVICE — DEVICE CLOSURE DISP 14G

## (undated) DEVICE — IRRIGATOR ENDOWRIST XI SUCTION

## (undated) DEVICE — DRAPE STERI LONG

## (undated) DEVICE — DRAPE ARM DAVINCI XI

## (undated) DEVICE — DRAPE CORETEMP FLD WRM 56X62IN

## (undated) DEVICE — SUT MCRYL PLUS 4-0 PS2 27IN

## (undated) DEVICE — BAG INZII TISS RETRV 12/15MM

## (undated) DEVICE — CANNULA SEAL 12MM

## (undated) DEVICE — DRAPE COLUMN DAVINCI XI

## (undated) DEVICE — TUBING MEDI-VAC 20FT .25IN

## (undated) DEVICE — SUT PROLENE 2-0 30 SH

## (undated) DEVICE — ELECTRODE REM PLYHSV RETURN 9

## (undated) DEVICE — GLOVE SIGNATURE ESSNTL LTX 8

## (undated) DEVICE — SEAL CANN UNIVERSAL 5-12MM

## (undated) DEVICE — CANNULA REDUCER 12-8MM

## (undated) DEVICE — DRAPE LAPSCP CHOLE 122X102X78

## (undated) DEVICE — SUT VLOC 90 3-0 V-20 NDL 6

## (undated) DEVICE — SET PNEUMOCLEAR HEAT HUM SE HF

## (undated) DEVICE — SEE L#5514

## (undated) DEVICE — STAPLER ECHELON PWR CIR 29MM

## (undated) DEVICE — NDL SAFETY 21G X 1 1/2 ECLPSE

## (undated) DEVICE — IRRIGATION SET Y-TYPE TUR/BLAD

## (undated) DEVICE — CONTAINER SPECIMEN OR STER 4OZ

## (undated) DEVICE — CATH MALECOT 30FR 6/BX

## (undated) DEVICE — SUT CTD VICRYL 0 UND BR SUT

## (undated) DEVICE — TIP GRASPER FENESTRATED DISP

## (undated) DEVICE — KIT ANTIFOG W/SPONG & FLUID

## (undated) DEVICE — SOL NORMAL USPCA 0.9%

## (undated) DEVICE — SYR 30CC LUER LOCK

## (undated) DEVICE — NDL ECLIPSE SAFETY 23G 1.5IN

## (undated) DEVICE — CLIP HEMO-LOK MLX LARGE LF

## (undated) DEVICE — GOWN POLY REINF BRTH SLV XL

## (undated) DEVICE — RELOAD SUREFORM 60 3.5 BLU 6R

## (undated) DEVICE — PACK SURG LITHOTOMY 3 SIRUS

## (undated) DEVICE — PACK BASIC SETUP SC BR

## (undated) DEVICE — GLOVE SIGNATURE ESSNTL LTX 6

## (undated) DEVICE — SUT 1 36IN PDS II VIO MONO

## (undated) DEVICE — TOWEL OR DISP STRL BLUE 4/PK

## (undated) DEVICE — COVER LIGHT HANDLE 80/CA

## (undated) DEVICE — TROCAR ENDOPATH XCEL 8MM 10CM